# Patient Record
Sex: MALE | Race: WHITE | NOT HISPANIC OR LATINO | Employment: OTHER | URBAN - METROPOLITAN AREA
[De-identification: names, ages, dates, MRNs, and addresses within clinical notes are randomized per-mention and may not be internally consistent; named-entity substitution may affect disease eponyms.]

---

## 2017-07-24 ENCOUNTER — HOSPITAL ENCOUNTER (OUTPATIENT)
Facility: CLINIC | Age: 62
Setting detail: OBSERVATION
Discharge: HOME | End: 2017-07-25
Attending: EMERGENCY MEDICINE | Admitting: INTERNAL MEDICINE
Payer: COMMERCIAL

## 2017-07-24 ENCOUNTER — APPOINTMENT (OUTPATIENT)
Dept: GENERAL RADIOLOGY | Facility: CLINIC | Age: 62
End: 2017-07-24
Attending: EMERGENCY MEDICINE
Payer: COMMERCIAL

## 2017-07-24 DIAGNOSIS — R07.9 CHEST PAIN: ICD-10-CM

## 2017-07-24 DIAGNOSIS — I10 BENIGN ESSENTIAL HYPERTENSION: Primary | ICD-10-CM

## 2017-07-24 LAB
ALBUMIN SERPL-MCNC: 3.6 G/DL (ref 3.4–5)
ALP SERPL-CCNC: 83 U/L (ref 40–150)
ALT SERPL W P-5'-P-CCNC: 31 U/L (ref 0–70)
ANION GAP SERPL CALCULATED.3IONS-SCNC: 8 MMOL/L (ref 3–14)
AST SERPL W P-5'-P-CCNC: 19 U/L (ref 0–45)
BILIRUB SERPL-MCNC: 0.5 MG/DL (ref 0.2–1.3)
BUN SERPL-MCNC: 7 MG/DL (ref 7–30)
CALCIUM SERPL-MCNC: 8.4 MG/DL (ref 8.5–10.1)
CHLORIDE SERPL-SCNC: 106 MMOL/L (ref 94–109)
CO2 SERPL-SCNC: 26 MMOL/L (ref 20–32)
CREAT SERPL-MCNC: 0.72 MG/DL (ref 0.66–1.25)
GFR SERPL CREATININE-BSD FRML MDRD: ABNORMAL ML/MIN/1.7M2
GLUCOSE SERPL-MCNC: 86 MG/DL (ref 70–99)
HBA1C MFR BLD: 5.4 % (ref 4.3–6)
INTERPRETATION ECG - MUSE: NORMAL
POTASSIUM SERPL-SCNC: 4.8 MMOL/L (ref 3.4–5.3)
PROT SERPL-MCNC: 6.8 G/DL (ref 6.8–8.8)
SODIUM SERPL-SCNC: 140 MMOL/L (ref 133–144)
TROPONIN I SERPL-MCNC: NORMAL UG/L (ref 0–0.04)

## 2017-07-24 PROCEDURE — 83036 HEMOGLOBIN GLYCOSYLATED A1C: CPT | Performed by: PHYSICIAN ASSISTANT

## 2017-07-24 PROCEDURE — 84484 ASSAY OF TROPONIN QUANT: CPT | Mod: 91 | Performed by: PHYSICIAN ASSISTANT

## 2017-07-24 PROCEDURE — 25000132 ZZH RX MED GY IP 250 OP 250 PS 637: Performed by: PHYSICIAN ASSISTANT

## 2017-07-24 PROCEDURE — 71020 XR CHEST 2 VW: CPT

## 2017-07-24 PROCEDURE — 99285 EMERGENCY DEPT VISIT HI MDM: CPT | Mod: 25

## 2017-07-24 PROCEDURE — 25000132 ZZH RX MED GY IP 250 OP 250 PS 637: Performed by: EMERGENCY MEDICINE

## 2017-07-24 PROCEDURE — 99220 ZZC INITIAL OBSERVATION CARE,LEVL III: CPT | Performed by: INTERNAL MEDICINE

## 2017-07-24 PROCEDURE — 93005 ELECTROCARDIOGRAM TRACING: CPT

## 2017-07-24 PROCEDURE — 84484 ASSAY OF TROPONIN QUANT: CPT | Mod: 91 | Performed by: EMERGENCY MEDICINE

## 2017-07-24 PROCEDURE — G0378 HOSPITAL OBSERVATION PER HR: HCPCS

## 2017-07-24 PROCEDURE — 36415 COLL VENOUS BLD VENIPUNCTURE: CPT | Performed by: PHYSICIAN ASSISTANT

## 2017-07-24 PROCEDURE — 80053 COMPREHEN METABOLIC PANEL: CPT | Performed by: EMERGENCY MEDICINE

## 2017-07-24 RX ORDER — IPRATROPIUM BROMIDE AND ALBUTEROL SULFATE 2.5; .5 MG/3ML; MG/3ML
3 SOLUTION RESPIRATORY (INHALATION) EVERY 4 HOURS PRN
Status: DISCONTINUED | OUTPATIENT
Start: 2017-07-24 | End: 2017-07-25 | Stop reason: HOSPADM

## 2017-07-24 RX ORDER — ASPIRIN 81 MG/1
324 TABLET, CHEWABLE ORAL ONCE
Status: COMPLETED | OUTPATIENT
Start: 2017-07-24 | End: 2017-07-24

## 2017-07-24 RX ORDER — ALBUTEROL SULFATE 0.83 MG/ML
2.5 SOLUTION RESPIRATORY (INHALATION)
Status: DISCONTINUED | OUTPATIENT
Start: 2017-07-24 | End: 2017-07-25 | Stop reason: HOSPADM

## 2017-07-24 RX ORDER — ACETAMINOPHEN 650 MG/1
650 SUPPOSITORY RECTAL EVERY 4 HOURS PRN
Status: DISCONTINUED | OUTPATIENT
Start: 2017-07-24 | End: 2017-07-25 | Stop reason: HOSPADM

## 2017-07-24 RX ORDER — ALUMINA, MAGNESIA, AND SIMETHICONE 2400; 2400; 240 MG/30ML; MG/30ML; MG/30ML
15-30 SUSPENSION ORAL EVERY 4 HOURS PRN
Status: DISCONTINUED | OUTPATIENT
Start: 2017-07-24 | End: 2017-07-25 | Stop reason: HOSPADM

## 2017-07-24 RX ORDER — NITROGLYCERIN 0.4 MG/1
0.4 TABLET SUBLINGUAL EVERY 5 MIN PRN
Status: DISCONTINUED | OUTPATIENT
Start: 2017-07-24 | End: 2017-07-25 | Stop reason: HOSPADM

## 2017-07-24 RX ORDER — NALOXONE HYDROCHLORIDE 0.4 MG/ML
.1-.4 INJECTION, SOLUTION INTRAMUSCULAR; INTRAVENOUS; SUBCUTANEOUS
Status: DISCONTINUED | OUTPATIENT
Start: 2017-07-24 | End: 2017-07-25 | Stop reason: HOSPADM

## 2017-07-24 RX ORDER — LISINOPRIL 10 MG/1
10 TABLET ORAL DAILY
Status: DISCONTINUED | OUTPATIENT
Start: 2017-07-24 | End: 2017-07-25 | Stop reason: HOSPADM

## 2017-07-24 RX ORDER — ASPIRIN 81 MG/1
162 TABLET, CHEWABLE ORAL ONCE
Status: DISCONTINUED | OUTPATIENT
Start: 2017-07-24 | End: 2017-07-24

## 2017-07-24 RX ORDER — HYDRALAZINE HYDROCHLORIDE 20 MG/ML
10 INJECTION INTRAMUSCULAR; INTRAVENOUS EVERY 4 HOURS PRN
Status: DISCONTINUED | OUTPATIENT
Start: 2017-07-24 | End: 2017-07-25 | Stop reason: HOSPADM

## 2017-07-24 RX ORDER — ASPIRIN 81 MG/1
81 TABLET ORAL DAILY
Status: DISCONTINUED | OUTPATIENT
Start: 2017-07-25 | End: 2017-07-25 | Stop reason: HOSPADM

## 2017-07-24 RX ORDER — SIMVASTATIN 40 MG
40 TABLET ORAL AT BEDTIME
Status: DISCONTINUED | OUTPATIENT
Start: 2017-07-24 | End: 2017-07-25 | Stop reason: HOSPADM

## 2017-07-24 RX ORDER — LIDOCAINE 40 MG/G
CREAM TOPICAL
Status: DISCONTINUED | OUTPATIENT
Start: 2017-07-24 | End: 2017-07-25 | Stop reason: HOSPADM

## 2017-07-24 RX ORDER — ACETAMINOPHEN 325 MG/1
650 TABLET ORAL EVERY 4 HOURS PRN
Status: DISCONTINUED | OUTPATIENT
Start: 2017-07-24 | End: 2017-07-25 | Stop reason: HOSPADM

## 2017-07-24 RX ORDER — GUAIFENESIN 600 MG/1
600 TABLET, EXTENDED RELEASE ORAL 2 TIMES DAILY
Status: DISCONTINUED | OUTPATIENT
Start: 2017-07-24 | End: 2017-07-25 | Stop reason: HOSPADM

## 2017-07-24 RX ADMIN — FLUTICASONE FUROATE AND VILANTEROL TRIFENATATE 1 PUFF: 100; 25 POWDER RESPIRATORY (INHALATION) at 14:15

## 2017-07-24 RX ADMIN — LISINOPRIL 10 MG: 10 TABLET ORAL at 13:41

## 2017-07-24 RX ADMIN — ASPIRIN 81 MG 324 MG: 81 TABLET ORAL at 09:34

## 2017-07-24 RX ADMIN — GUAIFENESIN 600 MG: 600 TABLET, EXTENDED RELEASE ORAL at 21:06

## 2017-07-24 RX ADMIN — SIMVASTATIN 40 MG: 40 TABLET, FILM COATED ORAL at 21:06

## 2017-07-24 ASSESSMENT — ENCOUNTER SYMPTOMS
HEADACHES: 1
ARTHRALGIAS: 1

## 2017-07-24 NOTE — ED NOTES
Bed: ED22  Expected date:   Expected time:   Means of arrival:   Comments:  North -  chest pain eta 0815

## 2017-07-24 NOTE — H&P
[facility]    History and Physical  Hospitalist       Date of Admission:  7/24/2017  Date of Service (when I saw the patient): 07/24/17    Assessment & Plan   North Marshall is a 62 year old male with past medical history of COPD, hyperlipidemia, tobacco abuse, non-obstructive CAD who presents with chest pain.    Chest pain. Patient with two days of stabbing chest pain with exertion. Patient has significant risk factors of tobacco abuse (2-3 PPD) and hyperlipidemia with suspected undiagnosed hypertension. 12 lead EKG in ED shows no ST elevation. Initial troponin negative.  --  Admit to observation  --  Continue to trend troponins x2  --  Monitor on telemetry  --  NM lexiscan tomorrow AM    Hypertension. Patients blood pressure initially 180/82 on presentation. Subsequently improved to 153/79. Looking back at prior clinic visits, patient has had elevated blood pressure during those visits.  --  Start Lisinopril 10 mg daily    Hx Coronary Artery Disease: Patient has had a CT coronary angiogram that shows non-obstructive CAD. Continue daily ASA, simvastatin. Continue workup as noted above.    COPD: Patient does not take any inhalers at home but has frequent episodes of bronchitis.   --  Start Advair BID  --  PRN DuoNebs and albuterol    Hyperlipidemia: Continue prior to admission simvastatin.   --  Check fasting lipid panel tomorrow AM.    Tobacco Abuse: Patient smokes 2-3 packs per day. He declines counseling while hospitalized. I encouraged cessation and explained cardiac risks. Patient requests nicotine gum while hospitalized.    DVT Prophylaxis: Low Risk/Ambulatory with no VTE prophylaxis indicated  Code Status: Full Code    Disposition: Expected discharge in 1-2 days once cardiac workup complete.    Cheryl Lancaster PA-C    The patient was discussed with Dr. De Los Santos who agrees with the plan as outlined above.    Primary Care Physician   Dr Luis Alfredo Villalobos    Chief Complaint  "  Chest pain    History is obtained from the patient    History of Present Illness   North Marshall is a 62 year old male with past medical history significant for COPD, hyperlipidemia, tobacco abuse, peripheral vascular disease who presents with chest pain. Patient has had two days of chest pain provoked by activity. Pain initially awoke patient from sleep 7/22. Since that time, with small amounts of activity, patient has sharp stabbing chest pain on the left side of his chest. This will last for many minutes to an hour before residing with rest. His wife notes that she feels he has been breathing more heavily with these episodes, however the patient denies shortness of breath. Due to ongoing symptoms he presented to [facility] for further evaluation.     Patient has a recent CT coronary from 6/2016 that showed nonobstructive coronary artery disease with luminal irregularity in LAD and circumflex. NM stress test from 12/2013 shows no abnormalities.    I met with the patient in the ED. He denies any chest pain with rest. Prior to two days ago, he denies any chest pain with exertion. No chest pain with rest. He endorses smoking 2-3 packs of cigarettes daily \"for my whole life\". He irregularly follows with a primary care provider. He notes he has been coughing more frequently and feels as though he has chest congestion. Denies any diaphoresis, nausea, vomiting with the chest pain. He has had tingling in his left hand with the chest pain.    Past Medical History    I have reviewed this patient's medical history and updated it with pertinent information if needed.   COPD  HLP  WPW  Anxiety  Tobacco Abuse  Osteoarthritis  Peripheral Vascular Disease  CAD nonobstructive  GERD  Vitamin D deficiency    Past Surgical History   I have reviewed this patient's surgical history and updated it with pertinent information if needed.  Appendectomy  Ascending aortic aneurysm repair    Prior to Admission Medications "   Prior to Admission Medications   Prescriptions Last Dose Informant Patient Reported? Taking?   Ibuprofen (ADVIL PO) Past Month at prn Self Yes Yes   Sig: Take 200 mg by mouth every 8 hours as needed for moderate pain   SIMVASTATIN PO 7/23/2017 at Unknown time Self Yes Yes   Sig: Take 40 mg by mouth At Bedtime      Facility-Administered Medications: None     Allergies   No Known Allergies    Social History   I have reviewed this patient's social history and updated it with pertinent information if needed. [place] Marshall      Family History   I have reviewed this patient's family history and updated it with pertinent information if needed. Reviewed and noncontributory  Review of Systems   The 10 point Review of Systems is negative other than noted in the HPI    Physical Exam   Temp: 98.2  F (36.8  C) Temp src: Oral BP: 153/79   Heart Rate: 59 Resp: 12 SpO2: 97 % O2 Device: None (Room air)    Vital Signs with Ranges  Temp:  [98.2  F (36.8  C)] 98.2  F (36.8  C)  Heart Rate:  [59] 59  Resp:  [12] 12  BP: (153-180)/(79-82) 153/79  SpO2:  [97 %] 97 %  220 lbs 0 oz    Constitutional: Well appearing male  HEENT: Equal and reactive to light. Mucous membranes moist  Respiratory: CTAB  Cardiovascular: RRR, no murmur  GI: Obese, soft non tender, non distended, normoactive bowel sounds  Skin: warm, dry  Musculoskeletal: no gross deformities  Neurologic: oriented x3  Psychiatric: calm, cooperative    Data   Data reviewed today:  I personally reviewed the EKG tracing showing normal sinus rhythm.    Recent Labs  Lab 07/24/17  0915      POTASSIUM 4.8   CHLORIDE 106   CO2 26   BUN 7   CR 0.72   ANIONGAP 8   LIZBETH 8.4*   GLC 86   ALBUMIN 3.6   PROTTOTAL 6.8   BILITOTAL 0.5   ALKPHOS 83   ALT 31   AST 19   TROPI <0.015The 99th percentile for upper reference range is 0.045 ug/L.  Troponin values in the range of 0.045 - 0.120 ug/L may be associated with risks of adverse clinical events.       No results found for this or any  previous visit (from the past 24 hour(s)).

## 2017-07-24 NOTE — ED PROVIDER NOTES
History     Chief Complaint:  Chest Pain      The history is provided by the patient.      North Marshall is a 62 year old male with a history of COPD, HLD, and WPW who presents with chest pain. The patient began having chest pain 7/22 with associated left hand tingling. He was telling his symptoms to a nurse who told him to go to the ED via EMS. His BP was 196/100. Upon arrival he reports pain in his chest that is aggravated with activity, but at rest it is okay. He has never experienced this type of pain before. He currently rates his pain 3/10 in severity. He also reports that the pain radiates to his left shoulder and jaw, and he has a headache. He has no other medical concerns.    Allergies:  Influenza vaccine    Medications:    Mycostatin  Zocor  Deltasone  Albuterol  Medrol  Simvastatin    Past Medical History:    Acute ill-defined cerebrovascular disease  Lung disease  PVD  Prostate disorder  Lumbar disc displacement without myelopathy  HLD  Osteoarthritis    Past Surgical History:    Appendectomy  Descending aortic aneurysm w/ stent    Family History:    Bipolar disorder  Prostate cancer    Social History:  Presents via EMS and with family   Tobacco use: 0.50 PPD for 40 years  Alcohol use: 16.8 oz per week  PCP: Luis Alfredo Villalobos    Marital Status:       Review of Systems   Cardiovascular: Positive for chest pain.   Musculoskeletal: Positive for arthralgias.   Neurological: Positive for headaches.   All other systems reviewed and are negative.    Physical Exam     Patient Vitals for the past 24 hrs:   BP Temp Temp src Heart Rate Resp SpO2 Height Weight   07/24/17 1030 153/79 - - - - - - -   07/24/17 0849 180/82 98.2  F (36.8  C) Oral 59 12 97 % 1.829 m (6') 99.8 kg (220 lb)       Physical Exam  Constitutional: The patient is oriented to person, place, and time.   HENT:   Head: Atraumatic  Right Ear: Normal  Left Ear: Normal  Nose: Nose normal.   Mouth/Throat: Oropharynx is clear and  moist. No erythema or exudate.   Eyes: Conjunctivae and EOM are normal. Pupils are equal, round, and reactive to light. No discharge  Neck: Normal range of motion. Neck supple.   Cardiovascular: Normal rate, regular rhythm, no murmur gallops or rubs. Intact distal pulses.    Pulmonary/Chest: CTA bilaterally. No wheezes rale or rhonchi.  Abdominal: Soft. Non tender.  No masses   Musculoskeletal: No edema. No bony deformity. Normal range of motion  Lymphadenopathy: The patient has no cervical adenopathy.   Neurological: The patient is alert and oriented to person, place, and time. The patient has normal strength and normal reflexes. No cranial nerve deficit. Coordination normal.  Skin: Skin is warm and dry. No rash noted. The patient is not diaphoretic.   Psychiatric: The patient has a normal mood and affect.     Emergency Department Course   ECG (8:46:46):  Rate 65 bpm. VA interval 164. QRS duration 80. QT/QTc 410/426. P-R-T axes 53 -4 38. Normal sinus rhythm. Normal ECG. Agree with computer interpretation. Interpreted at 0920 by Jm Elizabeth MD.     Imaging:  Radiographic findings were communicated with the patient who voiced understanding of the findings.  Chest XR, PA & LAT  IMPRESSION: The cardiac silhouette and pulmonary vasculature are  normal. No evidence of pneumothorax or pleural effusion. There are no  acute infiltrates.    SHARLENE PARRA MD    Imaging independently reviewed and agree with radiologist interpretation.    Laboratory:  BMP: Calcium 8.4 (L), o/w WNL (Creatinine 0.72)  1030: Troponin: <0.015    Interventions:  0934: Aspirin 324 mg PO    Emergency Department Course:  Past medical records, nursing notes, and vitals reviewed.  0919: I performed an exam of the patient and obtained history, as documented above.     Findings and plan explained to the Patient who consents to admission.     1055: Discussed the patient with Dr. De Los Santos, who will admit the patient to a observation bed for further  monitoring, evaluation, and treatment.     Impression & Plan    Medical Decision Making:  North Marshall is a 62 year old male who presents for evaluation of chest pain. Initial laboratory and imaging tests have come back normal. There is no clinical, laboratory, or radiographic evidence of pulmonary embolism, aortic dissection, pneumonia, pneumothorax or cardiac ischemia.  Other etiologies of chest pain considered in this patient included chest wall source, esophageal spasm or GI source, pleuritis, referred pain, etc.  My suspicion of unstable angina at this point is very low and given risk/benefit ratio would not start Lovenox or heparin. Given the nature and timing of the patient's symptoms, I will admit the patient  to observation status to the Observation Unit for further cardiac monitoring, follow up troponin markers, and stress echocardiography should the enzymes remain negative.  Dr. De Los Santos is accepting.    Diagnosis:    ICD-10-CM   1. Chest pain R07.9       Disposition:  Admitted to Dr. De Los Santos for further evaluation and treatment.      Duane Del Valle  7/24/2017    EMERGENCY DEPARTMENT  I, Duane Del Valle, am serving as a scribe at 9:19 AM on 7/24/2017 to document services personally performed by Jm Elizabeth MD based on my observations and the provider's statements to me.       Jm Elizabeth MD  07/24/17 7183

## 2017-07-24 NOTE — IP AVS SNAPSHOT
[facility]    [address] [place] [address]., [address]    [place] [address]    Phone:  [phone]                                       After Visit Summary   7/24/2017    North Marshall    MRN: [id number]           After Visit Summary Signature Page     I have received my discharge instructions, and my questions have been answered. I have discussed any challenges I see with this plan with the nurse or doctor.    ..........................................................................................................................................  Patient/Patient Representative Signature      ..........................................................................................................................................  Patient Representative Print Name and Relationship to Patient    ..................................................               ................................................  Date                                            Time    ..........................................................................................................................................  Reviewed by Signature/Title    ...................................................              ..............................................  Date                                                            Time

## 2017-07-24 NOTE — IP AVS SNAPSHOT
MRN:[id number]                      After Visit Summary   7/24/2017    North Marshall    MRN: [id number]           Thank you!     Thank you for choosing [facility] for your care. Our goal is always to provide you with excellent care. Hearing back from our patients is one way we can continue to improve our services. Please take a few minutes to complete the written survey that you may receive in the mail after you visit with us. Thank you!        Patient Information     Date Of Birth          1955        Designated Caregiver       Most Recent Value    Caregiver    Will someone help with your care after discharge? no      About your hospital stay     You were admitted on:  July 24, 2017 You last received care in the:  [facility]    You were discharged on:  July 25, 2017        Reason for your hospital stay       You have been hospitalied for chest pain and uncontrolled hypertension.                  Who to Call     For medical emergencies, please call 911.  For non-urgent questions about your medical care, please call your primary care provider or clinic, [phone]          Attending Provider     Provider Specialty    Jm Elizabeth MD Emergency Medicine    [facility], Parker FRANCIS MD Internal Medicine       Primary Care Provider Office Phone # Fax #    Luis Alfredo Villalobos [phone]      After Care Instructions     Activity       Your activity upon discharge: activity as tolerated      No tobacco use            Diet       Follow this diet upon discharge: Orders Placed This Encounter      Combination Diet Low Saturated Fat Na <2400mg Diet                  Follow-up Appointments     Follow-up and recommended labs and tests        Follow up with primary care provider, Luis Alfredo Villalobos, within 14 days for hospital follow- up.  The following labs/tests are recommended: Basic metabolic panel.                  Pending Results     No orders  "found for last 3 day(s).            Statement of Approval     Ordered          17 1515  I have reviewed and agree with all the recommendations and orders detailed in this document.  EFFECTIVE NOW     Approved and electronically signed by:  Parker De Los Santos MD             Admission Information     Date & Time Provider Department Dept. Phone    2017 Parker De Los Santos MD [facility] [phone]      Your Vitals Were     Blood Pressure Temperature Respirations Height Weight Pulse Oximetry    132/68 (BP Location: Left arm) 97.7  F (36.5  C) (Oral) 16 1.829 m (6') 96 kg (211 lb 10.3 oz) 97%    BMI (Body Mass Index)                   28.7 kg/m2           [facility] Information     [facility] lets you send messages to your doctor, view your test results, renew your prescriptions, schedule appointments and more. To sign up, go to [email or web address] . Click on \"Log in\" on the left side of the screen, which will take you to the Welcome page. Then click on \"Sign up Now\" on the right side of the page.     You will be asked to enter the access code listed below, as well as some personal information. Please follow the directions to create your username and password.     Your access code is: [email or web address]  Expires: 10/22/2017 10:37 AM     Your access code will  in 90 days. If you need help or a new code, please call your [place] [facility] clinic or [phone].        Care EveryWhere ID     This is your Care EveryWhere ID. This could be used by other organizations to access your [facility] medical records  [id number]        Equal Access to Services     [facility]: Hadii sherman Guzman, wagiancarlo mehta, qaholly cm. So [facility] [phone].    ATENCIÓN: Si habla español, tiene a enrique disposición servicios gratuitos de asistencia lingüística. Llame al [phone].    We comply with applicable federal civil rights laws and [place] " laws. We do not discriminate on the basis of race, color, national origin, age, disability sex, sexual orientation or gender identity.               Review of your medicines      START taking        Dose / Directions    aspirin 81 MG EC tablet   Used for:  Benign essential hypertension        Dose:  81 mg   Take 1 tablet (81 mg) by mouth daily   Quantity:  30 tablet   Refills:  0       lisinopril 10 MG tablet   Commonly known as:  PRINIVIL/ZESTRIL   Used for:  Benign essential hypertension        Dose:  10 mg   Take 1 tablet (10 mg) by mouth daily   Quantity:  30 tablet   Refills:  0         CONTINUE these medicines which have NOT CHANGED        Dose / Directions    SIMVASTATIN PO        Dose:  40 mg   Take 40 mg by mouth At Bedtime   Refills:  0         STOP taking     ADVIL PO                Where to get your medicines      These medications were sent to [facility]/pharmacy #[id number] - [place], [place] - [address]  [address] [address]     Phone:  [phone]     aspirin 81 MG EC tablet    lisinopril 10 MG tablet                Protect others around you: Learn how to safely use, store and throw away your medicines at www.disposemymeds.org.             Medication List: This is a list of all your medications and when to take them. Check marks below indicate your daily home schedule. Keep this list as a reference.      Medications           Morning Afternoon Evening Bedtime As Needed    aspirin 81 MG EC tablet   Take 1 tablet (81 mg) by mouth daily   Last time this was given:  81 mg on 7/25/2017  7:48 AM                                   lisinopril 10 MG tablet   Commonly known as:  PRINIVIL/ZESTRIL   Take 1 tablet (10 mg) by mouth daily   Last time this was given:  10 mg on 7/25/2017 10:49 AM                                   SIMVASTATIN PO   Take 40 mg by mouth At Bedtime   Last time this was given:  40 mg on 7/24/2017  9:06 PM

## 2017-07-24 NOTE — ED NOTES
[facility]  ED Nurse Handoff Report    ED Chief complaint: Chest Pain (Pain started saturday night at the cabin. Left hand tingling. High blood pressure 186/100)      ED Diagnosis:   Final diagnoses:   Chest pain       Code Status: Full Code    Allergies: No Known Allergies    Activity level - Baseline/Home:  Independent       Activity Level - Current:   Independent     Needed?: No    Isolation: Yes  Infection: Not Applicable    Bariatric?: No    Vital Signs:   Vitals:    07/24/17 0849 07/24/17 1030   BP: 180/82 153/79   Resp: 12    Temp: 98.2  F (36.8  C)    TempSrc: Oral    SpO2: 97%    Weight: 99.8 kg (220 lb)    Height: 1.829 m (6')        Cardiac Rhythm: ,     NSR  Pain level: 0-10 Pain Scale: 3    Is this patient confused?: No    Patient Report: Initial Complaint:  Chest pain started on Sunday night while he was on his cabin  Focused Assessment: Admitted with chest pain, elevated blood pressures 180/82, smoker.  This morning he was at work and the pain started again.  In ED initial blood pressure 180/82 , last /73.  Mid sternal pain, numbness on left hand.  Tests Performed:  EKG  Is NSR.  Labs done.  Abnormal Results:   Treatments provided: Aspirin 324 mg    Family Comments: Present in ER.  Supportive.    OBS brochure/video discussed/provided to patient: yes      ED Medications:   Medications   aspirin chewable tablet 324 mg (324 mg Oral Given 7/24/17 0934)       Drips infusing?:  No      ED NURSE PHONE NUMBER: [phone]

## 2017-07-24 NOTE — PLAN OF CARE
Problem: Goal Outcome Summary  Goal: Goal Outcome Summary  Outcome: No Change  A/O, VSS ex BP 140s-150s/60s-70s, O2sats 97% on RA. Tele NSR, 50s-60s, pt states HR can go into 40s when sleeping. Denies pain, CP, or SOB; mild GRISSOM. Independent, steady. Given education on lisinopril and breo ellipta inhaler. Plan for NM stress test tomorrow AM.

## 2017-07-25 ENCOUNTER — APPOINTMENT (OUTPATIENT)
Dept: NUCLEAR MEDICINE | Facility: CLINIC | Age: 62
End: 2017-07-25
Attending: PHYSICIAN ASSISTANT
Payer: COMMERCIAL

## 2017-07-25 ENCOUNTER — APPOINTMENT (OUTPATIENT)
Dept: CARDIOLOGY | Facility: CLINIC | Age: 62
End: 2017-07-25
Attending: PHYSICIAN ASSISTANT
Payer: COMMERCIAL

## 2017-07-25 VITALS
RESPIRATION RATE: 16 BRPM | TEMPERATURE: 97.7 F | WEIGHT: 211.64 LBS | DIASTOLIC BLOOD PRESSURE: 68 MMHG | OXYGEN SATURATION: 97 % | BODY MASS INDEX: 28.67 KG/M2 | HEIGHT: 72 IN | SYSTOLIC BLOOD PRESSURE: 132 MMHG

## 2017-07-25 LAB
CHOLEST SERPL-MCNC: 154 MG/DL
HDLC SERPL-MCNC: 33 MG/DL
LDLC SERPL CALC-MCNC: 84 MG/DL
NONHDLC SERPL-MCNC: 121 MG/DL
TRIGL SERPL-MCNC: 185 MG/DL

## 2017-07-25 PROCEDURE — 93018 CV STRESS TEST I&R ONLY: CPT | Performed by: INTERNAL MEDICINE

## 2017-07-25 PROCEDURE — 93016 CV STRESS TEST SUPVJ ONLY: CPT | Performed by: INTERNAL MEDICINE

## 2017-07-25 PROCEDURE — 25000132 ZZH RX MED GY IP 250 OP 250 PS 637: Performed by: PHYSICIAN ASSISTANT

## 2017-07-25 PROCEDURE — 78452 HT MUSCLE IMAGE SPECT MULT: CPT | Mod: 26 | Performed by: INTERNAL MEDICINE

## 2017-07-25 PROCEDURE — 80061 LIPID PANEL: CPT | Performed by: PHYSICIAN ASSISTANT

## 2017-07-25 PROCEDURE — 40000855 ZZH STATISTIC ECHO STRESS OR NM NPI

## 2017-07-25 PROCEDURE — A9502 TC99M TETROFOSMIN: HCPCS | Performed by: INTERNAL MEDICINE

## 2017-07-25 PROCEDURE — 78452 HT MUSCLE IMAGE SPECT MULT: CPT

## 2017-07-25 PROCEDURE — G0378 HOSPITAL OBSERVATION PER HR: HCPCS

## 2017-07-25 PROCEDURE — 25000128 H RX IP 250 OP 636: Performed by: INTERNAL MEDICINE

## 2017-07-25 PROCEDURE — 34300033 ZZH RX 343: Performed by: INTERNAL MEDICINE

## 2017-07-25 PROCEDURE — 93017 CV STRESS TEST TRACING ONLY: CPT

## 2017-07-25 PROCEDURE — 99217 ZZC OBSERVATION CARE DISCHARGE: CPT | Performed by: INTERNAL MEDICINE

## 2017-07-25 PROCEDURE — 36415 COLL VENOUS BLD VENIPUNCTURE: CPT | Performed by: PHYSICIAN ASSISTANT

## 2017-07-25 RX ORDER — ACYCLOVIR 200 MG/1
0-1 CAPSULE ORAL
Status: DISCONTINUED | OUTPATIENT
Start: 2017-07-25 | End: 2017-07-25

## 2017-07-25 RX ORDER — ALBUTEROL SULFATE 90 UG/1
2 AEROSOL, METERED RESPIRATORY (INHALATION) EVERY 5 MIN PRN
Status: DISCONTINUED | OUTPATIENT
Start: 2017-07-25 | End: 2017-07-25

## 2017-07-25 RX ORDER — REGADENOSON 0.08 MG/ML
0.4 INJECTION, SOLUTION INTRAVENOUS ONCE
Status: DISCONTINUED | OUTPATIENT
Start: 2017-07-25 | End: 2017-07-25

## 2017-07-25 RX ORDER — LISINOPRIL 10 MG/1
10 TABLET ORAL DAILY
Qty: 30 TABLET | Refills: 0 | Status: SHIPPED | OUTPATIENT
Start: 2017-07-25 | End: 2021-10-13

## 2017-07-25 RX ORDER — AMINOPHYLLINE 25 MG/ML
50-100 INJECTION, SOLUTION INTRAVENOUS
Status: DISCONTINUED | OUTPATIENT
Start: 2017-07-25 | End: 2017-07-25

## 2017-07-25 RX ADMIN — LISINOPRIL 10 MG: 10 TABLET ORAL at 10:49

## 2017-07-25 RX ADMIN — TETROFOSMIN 34 MCI.: 1.38 INJECTION, POWDER, LYOPHILIZED, FOR SOLUTION INTRAVENOUS at 09:04

## 2017-07-25 RX ADMIN — FLUTICASONE FUROATE AND VILANTEROL TRIFENATATE 1 PUFF: 100; 25 POWDER RESPIRATORY (INHALATION) at 07:47

## 2017-07-25 RX ADMIN — GUAIFENESIN 600 MG: 600 TABLET, EXTENDED RELEASE ORAL at 07:47

## 2017-07-25 RX ADMIN — REGADENOSON 0.4 MG: 0.08 INJECTION, SOLUTION INTRAVENOUS at 09:01

## 2017-07-25 RX ADMIN — ASPIRIN 81 MG: 81 TABLET, COATED ORAL at 07:48

## 2017-07-25 RX ADMIN — TETROFOSMIN 11.1 MCI.: 1.38 INJECTION, POWDER, LYOPHILIZED, FOR SOLUTION INTRAVENOUS at 07:05

## 2017-07-25 ASSESSMENT — ACTIVITIES OF DAILY LIVING (ADL)
BATHING: 0-->INDEPENDENT
SWALLOWING: 0-->SWALLOWS FOODS/LIQUIDS WITHOUT DIFFICULTY
AMBULATION: 0-->INDEPENDENT
TOILETING: 0-->INDEPENDENT
RETIRED_EATING: 0-->INDEPENDENT
TRANSFERRING: 0-->INDEPENDENT
FALL_HISTORY_WITHIN_LAST_SIX_MONTHS: NO
RETIRED_COMMUNICATION: 0-->UNDERSTANDS/COMMUNICATES WITHOUT DIFFICULTY
DRESS: 0-->INDEPENDENT
COGNITION: 0 - NO COGNITION ISSUES REPORTED

## 2017-07-25 NOTE — DISCHARGE SUMMARY
[facility]    Discharge Summary  Hospitalist    Date of Admission:  7/24/2017  Date of Discharge:  7/25/2017  4:06 PM  Discharging Provider: Parker De Los Santos MD  Date of Service (when I saw the patient): 07/25/17    Discharge Diagnoses   Chest pain, noncardiac.   Hypertensive urgency  Tobacco abuse    History of Present Illness   [place] is an 62 year old male who presented with chest pain.     Hospital Course   [place] Marshall was admitted on 7/24/2017.  The following problems were addressed during his hospitalization:    Hospital Problems:    The patient was admitted with exertional chest pain and was also noted to have hypertensive urgency. He was admitted as an observation patient, serial troponins were performed which remained within normal limits.    A lexiscan stress test was performed the results of which are as follows.     Impression  1.  Myocardial perfusion imaging using single isotope technique  demonstrated a small relatively fixed inferior/inferolateral defect  extending from the base towards the apex, rest greater than stress,  most likely consistent with diaphragm attenuation/bowel uptake  artifact. No significant area of ischemia or infarct noted..   2. Gated images demonstrated normal size left ventricle with vigorous  overall contractility and no significant regional wall motion  abnormality.  The left ventricular systolic function is hyperdynamic  with ejection fraction 80%.  3. Compared to the prior study from 1/16/2004 no significant change .    The patient did well overall. He was initiated on lisinopril and his blood pressures have subsequently improved. He should follow up with his primary MD regarding titration of the lisinopril.    I also counseled him at length on the need to quit smoking permanently.     Parker De Los Santos MD    Significant Results and Procedures     See above.     Pending Results      None    Code Status   Full Code       Primary Care  Physician   Luis Alfredo Villalobos    Physical Exam   Temp: 97.7  F (36.5  C) Temp src: Oral BP: 132/68   Heart Rate: 45 Resp: 16 SpO2: 97 % O2 Device: None (Room air)    Vitals:    07/24/17 0849 07/24/17 1307 07/25/17 0500   Weight: 99.8 kg (220 lb) 97.3 kg (214 lb 8.1 oz) 96 kg (211 lb 10.3 oz)     Vital Signs with Ranges  Temp:  [97.6  F (36.4  C)-98  F (36.7  C)] 97.7  F (36.5  C)  Heart Rate:  [45-81] 45  Resp:  [16-20] 16  BP: (124-136)/(62-68) 132/68  SpO2:  [93 %-97 %] 97 %  I/O last 3 completed shifts:  In: 240 [P.O.:240]  Out: 325 [Urine:325]      Discharge Disposition   Discharged to home  Condition at discharge: Stable    Consultations This Hospital Stay   None    Time Spent on this Encounter   I, Parker De Los Santos MD, personally saw the patient today and spent greater than 30 minutes discharging this patient.    Discharge Orders     Follow-up and recommended labs and tests    Follow up with primary care provider, Luis Alfredo Villalobos, within 14 days for hospital follow- up.  The following labs/tests are recommended: Basic metabolic panel.     Reason for your hospital stay   You have been hospitalied for chest pain and uncontrolled hypertension.     Activity   Your activity upon discharge: activity as tolerated      No tobacco use     Follow-up and recommended labs and tests    Follow up hospital appointment made with Dr. Luis Alfredo Villalobos at [facility]   Phone: [phone]  Appointment : August 3rd, check in at l:15 for 1:30 appointment. Bring insurance information and ID. PLease bring this paper work with you to your clinic visit. They will be getting a BMP lab at this visit.     Diet   Follow this diet upon discharge: Orders Placed This Encounter     Combination Diet Low Saturated Fat Na <2400mg Diet       Discharge Medications   Discharge Medication List as of 7/25/2017  3:30 PM      START taking these medications    Details   aspirin EC 81 MG EC tablet Take 1 tablet (81 mg) by mouth daily,  Disp-30 tablet, R-0, E-Prescribe      lisinopril (PRINIVIL/ZESTRIL) 10 MG tablet Take 1 tablet (10 mg) by mouth daily, Disp-30 tablet, R-0, E-Prescribe         CONTINUE these medications which have NOT CHANGED    Details   SIMVASTATIN PO Take 40 mg by mouth At Bedtime, Historical         STOP taking these medications       Ibuprofen (ADVIL PO) Comments:   Reason for Stopping:             Allergies   No Known Allergies  Data   Most Recent 3 CBC's:No lab results found.   Most Recent 3 BMP's:  Recent Labs   Lab Test  07/24/17   0915   NA  140   POTASSIUM  4.8   CHLORIDE  106   CO2  26   BUN  7   CR  0.72   ANIONGAP  8   LIZBETH  8.4*   GLC  86     Most Recent 2 LFT's:  Recent Labs   Lab Test  07/24/17   0915   AST  19   ALT  31   ALKPHOS  83   BILITOTAL  0.5     Most Recent INR's and Anticoagulation Dosing History:  Anticoagulation Dose History     There is no flowsheet data to display.        Most Recent 3 Troponin's:  Recent Labs   Lab Test  07/24/17 2005 07/24/17   1322  07/24/17   0915   TROPI  <0.015  The 99th percentile for upper reference range is 0.045 ug/L.  Troponin values in   the range of 0.045 - 0.120 ug/L may be associated with risks of adverse   clinical events.    <0.015  The 99th percentile for upper reference range is 0.045 ug/L.  Troponin values in   the range of 0.045 - 0.120 ug/L may be associated with risks of adverse   clinical events.    <0.015  The 99th percentile for upper reference range is 0.045 ug/L.  Troponin values in   the range of 0.045 - 0.120 ug/L may be associated with risks of adverse   clinical events.       Most Recent Cholesterol Panel:  Recent Labs   Lab Test  07/25/17   0615   CHOL  154   LDL  84   HDL  33*   TRIG  185*     Most Recent 6 Bacteria Isolates From Any Culture (See EPIC Reports for Culture Details):No lab results found.  Most Recent TSH, T4 and A1c Labs:  Recent Labs   Lab Test  07/24/17   1322   A1C  5.4     Results for orders placed or performed during the hospital  encounter of 07/24/17   Chest XR,  PA & LAT    Narrative    XR CHEST 2 VW 7/24/2017 11:51 AM     HISTORY: chest pain    COMPARISON: 11/3/2008      Impression    IMPRESSION: The cardiac silhouette and pulmonary vasculature are  normal. No evidence of pneumothorax or pleural effusion. There are no  acute infiltrates.    SHARLENE PARRA MD   NM Lexiscan stress test (nuc card)    Narrative    GATED MYOCARDIAL PERFUSION SCINTIGRAPHY WITH INTRAVENOUS PHARMACOLOGIC  VASODILATATION LEXISCAN -ONE DAY STUDY     7/25/2017 10:37 AM  GRACE BELLA  62 years  Male  1955.    Indication/Clinical History: S pain    Impression  1.  Myocardial perfusion imaging using single isotope technique  demonstrated a small relatively fixed inferior/inferolateral defect  extending from the base towards the apex, rest greater than stress,  most likely consistent with diaphragm attenuation/bowel uptake  artifact. No significant area of ischemia or infarct noted..   2. Gated images demonstrated normal size left ventricle with vigorous  overall contractility and no significant regional wall motion  abnormality.  The left ventricular systolic function is hyperdynamic  with ejection fraction 80%.  3. Compared to the prior study from 1/16/2004 no significant change .    Procedure  Pharmacologic stress testing was performed with Lexiscan at a rate of  0.08 mg/ml rapid bolus injection, for 15 seconds, 0.4 mg/5ml  intravenously. Low-level exercise was performed along with the  vasodilator infusion.  The heart rate was 47 at baseline and earl to  74 beats per minute during the Lexiscan infusion. The rest blood  pressure was 139/73 mmHg and was 136/62 mm Hg during Lexiscan  infusion. The patient experienced shortness of breath  during the  test.    Myocardial perfusion imaging was performed at rest, approximately 45  minutes after the injection intravenously of 11.1 mCi of Tc-99m  Myoview. At peak pharmacologic effect, 10-20 seconds after  Lexiscan,   the patient was injected intravenously with 34.0 mCi of  Tc-99m  Myoview. The post-stress tomographic imaging was performed  approximately 60 minutes after stress.    EKG Findings  The resting EKG demonstrated sinus bradycardia with no significant ST  abnormality. The stress EKG demonstrated normal sinus rhythm with no  significant ST depression.    Tomographic Findings  Overall, the study quality is adequate . On the stress images, there  is a very small mild inferior/inferolateral defect extending from the  base the mid ventricle. On the rest images, there is a small mild  inferior/inferolateral defect extending from the base towards the apex  . Gated images demonstrated normal size left ventricle with vigorous  overall contractility and no significant regional wall motion  abnormality. The left ventricular ejection fraction was calculated to  be 80% with stress 77% rest. TID was minimal most likely related IV  Lexiscan.    LIANA SANDOVAL MD

## 2017-07-25 NOTE — PLAN OF CARE
Problem: Goal Outcome Summary  Goal: Goal Outcome Summary  Pt d/c'd home with all belongings. All medications, d/c instructions, and follow up instructions/recommendations reviewed with pt and pt's wife. Pt verbalized understanding. Will follow up with PCP at [facility] within next 2 weeks.

## 2017-07-25 NOTE — PROGRESS NOTES
"Lexiscan: Has has Lexiscan previously and does not recall any problems with this. NKDA. Lungs very diminished, with minimal air movement. S1S2. Denies any pain. Pt walked on the treadmill during test. States he was feeling \"weird\", but denies SOB or CP. VSS. By the end of the exam, pt feeling OK, and walked back to room.  "

## 2017-07-25 NOTE — PLAN OF CARE
Problem: Goal Outcome Summary  Goal: Goal Outcome Summary  Outcome: No Change  Pt A&O. No chest pain, SOB or dizziness during the shift. SB-NSR on tele. VSS. Up independently.  NPO for lexiscan today. POC reviewed. Will continue to monitor pt.

## 2017-07-25 NOTE — PLAN OF CARE
Problem: Goal Outcome Summary  Goal: Goal Outcome Summary  Outcome: No Change  A&Ox4. Denies pain,no SOB. Vitals stable. TELE SB, HR in 40s-50s. Pt stated his HR goes up to 35 while sleeping. Asymptomatic. O2 sat maintained on 96-97% RA. NPO MN. Plan for nuclear stress tomorrow.

## 2019-09-03 ENCOUNTER — TRANSFERRED RECORDS (OUTPATIENT)
Dept: HEALTH INFORMATION MANAGEMENT | Facility: CLINIC | Age: 64
End: 2019-09-03
Payer: COMMERCIAL

## 2021-10-13 ENCOUNTER — HOSPITAL ENCOUNTER (OUTPATIENT)
Facility: CLINIC | Age: 66
Setting detail: OBSERVATION
Discharge: HOME | End: 2021-10-14
Attending: EMERGENCY MEDICINE | Admitting: INTERNAL MEDICINE
Payer: COMMERCIAL

## 2021-10-13 ENCOUNTER — APPOINTMENT (OUTPATIENT)
Dept: MRI IMAGING | Facility: CLINIC | Age: 66
End: 2021-10-13
Attending: EMERGENCY MEDICINE
Payer: COMMERCIAL

## 2021-10-13 ENCOUNTER — APPOINTMENT (OUTPATIENT)
Dept: CT IMAGING | Facility: CLINIC | Age: 66
End: 2021-10-13
Attending: EMERGENCY MEDICINE
Payer: COMMERCIAL

## 2021-10-13 DIAGNOSIS — S22.080A COMPRESSION FRACTURE OF T12 VERTEBRA, INITIAL ENCOUNTER (H): ICD-10-CM

## 2021-10-13 DIAGNOSIS — S06.4XAA EPIDURAL HEMATOMA (H): ICD-10-CM

## 2021-10-13 DIAGNOSIS — I10 BENIGN ESSENTIAL HYPERTENSION: ICD-10-CM

## 2021-10-13 DIAGNOSIS — S22.080A COMPRESSION FRACTURE OF T12 VERTEBRA, INITIAL ENCOUNTER (H): Primary | ICD-10-CM

## 2021-10-13 LAB
ANION GAP SERPL CALCULATED.3IONS-SCNC: 5 MMOL/L (ref 3–14)
BASOPHILS # BLD AUTO: 0 10E3/UL (ref 0–0.2)
BASOPHILS NFR BLD AUTO: 0 %
BUN SERPL-MCNC: 14 MG/DL (ref 7–30)
CALCIUM SERPL-MCNC: 8.8 MG/DL (ref 8.5–10.1)
CHLORIDE BLD-SCNC: 103 MMOL/L (ref 94–109)
CO2 SERPL-SCNC: 26 MMOL/L (ref 20–32)
CREAT SERPL-MCNC: 0.96 MG/DL (ref 0.66–1.25)
EOSINOPHIL # BLD AUTO: 0.1 10E3/UL (ref 0–0.7)
EOSINOPHIL NFR BLD AUTO: 1 %
ERYTHROCYTE [DISTWIDTH] IN BLOOD BY AUTOMATED COUNT: 12.5 % (ref 10–15)
GFR SERPL CREATININE-BSD FRML MDRD: 81 ML/MIN/1.73M2
GLUCOSE BLD-MCNC: 96 MG/DL (ref 70–99)
HCT VFR BLD AUTO: 42.3 % (ref 40–53)
HGB BLD-MCNC: 14.7 G/DL (ref 13.3–17.7)
IMM GRANULOCYTES # BLD: 0.1 10E3/UL
IMM GRANULOCYTES NFR BLD: 1 %
LYMPHOCYTES # BLD AUTO: 1.7 10E3/UL (ref 0.8–5.3)
LYMPHOCYTES NFR BLD AUTO: 17 %
MCH RBC QN AUTO: 32.4 PG (ref 26.5–33)
MCHC RBC AUTO-ENTMCNC: 34.8 G/DL (ref 31.5–36.5)
MCV RBC AUTO: 93 FL (ref 78–100)
MONOCYTES # BLD AUTO: 0.4 10E3/UL (ref 0–1.3)
MONOCYTES NFR BLD AUTO: 4 %
NEUTROPHILS # BLD AUTO: 7.7 10E3/UL (ref 1.6–8.3)
NEUTROPHILS NFR BLD AUTO: 77 %
NRBC # BLD AUTO: 0 10E3/UL
NRBC BLD AUTO-RTO: 0 /100
PLATELET # BLD AUTO: 191 10E3/UL (ref 150–450)
POTASSIUM BLD-SCNC: 3.9 MMOL/L (ref 3.4–5.3)
RBC # BLD AUTO: 4.54 10E6/UL (ref 4.4–5.9)
SODIUM SERPL-SCNC: 134 MMOL/L (ref 133–144)
WBC # BLD AUTO: 10 10E3/UL (ref 4–11)

## 2021-10-13 PROCEDURE — 80048 BASIC METABOLIC PNL TOTAL CA: CPT | Performed by: EMERGENCY MEDICINE

## 2021-10-13 PROCEDURE — 96375 TX/PRO/DX INJ NEW DRUG ADDON: CPT

## 2021-10-13 PROCEDURE — 72131 CT LUMBAR SPINE W/O DYE: CPT

## 2021-10-13 PROCEDURE — 96374 THER/PROPH/DIAG INJ IV PUSH: CPT

## 2021-10-13 PROCEDURE — 96372 THER/PROPH/DIAG INJ SC/IM: CPT | Performed by: EMERGENCY MEDICINE

## 2021-10-13 PROCEDURE — 99285 EMERGENCY DEPT VISIT HI MDM: CPT | Mod: 25

## 2021-10-13 PROCEDURE — 36415 COLL VENOUS BLD VENIPUNCTURE: CPT | Performed by: EMERGENCY MEDICINE

## 2021-10-13 PROCEDURE — 250N000011 HC RX IP 250 OP 636: Performed by: EMERGENCY MEDICINE

## 2021-10-13 PROCEDURE — 99205 OFFICE O/P NEW HI 60 MIN: CPT | Performed by: PHYSICIAN ASSISTANT

## 2021-10-13 PROCEDURE — C9803 HOPD COVID-19 SPEC COLLECT: HCPCS

## 2021-10-13 PROCEDURE — 72148 MRI LUMBAR SPINE W/O DYE: CPT

## 2021-10-13 PROCEDURE — 85004 AUTOMATED DIFF WBC COUNT: CPT | Performed by: EMERGENCY MEDICINE

## 2021-10-13 RX ORDER — VALSARTAN AND HYDROCHLOROTHIAZIDE 160; 12.5 MG/1; MG/1
1 TABLET, FILM COATED ORAL DAILY
COMMUNITY

## 2021-10-13 RX ORDER — ROSUVASTATIN CALCIUM 40 MG/1
40 TABLET, COATED ORAL DAILY
COMMUNITY

## 2021-10-13 RX ORDER — KETOROLAC TROMETHAMINE 15 MG/ML
15 INJECTION, SOLUTION INTRAMUSCULAR; INTRAVENOUS ONCE
Status: COMPLETED | OUTPATIENT
Start: 2021-10-13 | End: 2021-10-13

## 2021-10-13 RX ORDER — HYDROMORPHONE HYDROCHLORIDE 1 MG/ML
0.5 INJECTION, SOLUTION INTRAMUSCULAR; INTRAVENOUS; SUBCUTANEOUS
Status: COMPLETED | OUTPATIENT
Start: 2021-10-13 | End: 2021-10-13

## 2021-10-13 RX ORDER — DIAZEPAM 10 MG/2ML
5 INJECTION, SOLUTION INTRAMUSCULAR; INTRAVENOUS ONCE
Status: COMPLETED | OUTPATIENT
Start: 2021-10-13 | End: 2021-10-13

## 2021-10-13 RX ORDER — LORAZEPAM 2 MG/ML
1 INJECTION INTRAMUSCULAR ONCE
Status: COMPLETED | OUTPATIENT
Start: 2021-10-13 | End: 2021-10-13

## 2021-10-13 RX ADMIN — LORAZEPAM 1 MG: 2 INJECTION INTRAMUSCULAR; INTRAVENOUS at 21:44

## 2021-10-13 RX ADMIN — KETOROLAC TROMETHAMINE 15 MG: 15 INJECTION, SOLUTION INTRAMUSCULAR; INTRAVENOUS at 19:22

## 2021-10-13 RX ADMIN — HYDROMORPHONE HYDROCHLORIDE 0.5 MG: 1 INJECTION, SOLUTION INTRAMUSCULAR; INTRAVENOUS; SUBCUTANEOUS at 21:03

## 2021-10-13 RX ADMIN — DIAZEPAM 5 MG: 10 INJECTION, SOLUTION INTRAMUSCULAR; INTRAVENOUS at 19:21

## 2021-10-13 ASSESSMENT — MIFFLIN-ST. JEOR: SCORE: 1704.32

## 2021-10-13 NOTE — ED NOTES
Bed: ED19  Expected date:   Expected time:   Means of arrival:   Comments:  443  66 M back pain after fall  0202

## 2021-10-14 ENCOUNTER — APPOINTMENT (OUTPATIENT)
Dept: PHYSICAL THERAPY | Facility: CLINIC | Age: 66
End: 2021-10-14
Attending: INTERNAL MEDICINE
Payer: COMMERCIAL

## 2021-10-14 VITALS
WEIGHT: 200 LBS | TEMPERATURE: 98.7 F | HEIGHT: 71 IN | DIASTOLIC BLOOD PRESSURE: 56 MMHG | RESPIRATION RATE: 16 BRPM | SYSTOLIC BLOOD PRESSURE: 135 MMHG | OXYGEN SATURATION: 94 % | BODY MASS INDEX: 28 KG/M2 | HEART RATE: 57 BPM

## 2021-10-14 PROBLEM — E78.5 HYPERLIPIDEMIA: Status: ACTIVE | Noted: 2021-10-14

## 2021-10-14 PROBLEM — S22.080A COMPRESSION FRACTURE OF T12 VERTEBRA, INITIAL ENCOUNTER (H): Status: ACTIVE | Noted: 2021-10-14
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
## 2021-10-14 PROBLEM — I10 BENIGN ESSENTIAL HYPERTENSION: Status: ACTIVE | Noted: 2021-10-14

## 2021-10-14 PROBLEM — S06.4XAA EPIDURAL HEMATOMA (H): Status: ACTIVE | Noted: 2021-10-14

## 2021-10-14 LAB
APTT PPP: 33 SECONDS (ref 22–38)
INR PPP: 1.03 (ref 0.85–1.15)
SARS-COV-2 RNA RESP QL NAA+PROBE: NEGATIVE

## 2021-10-14 PROCEDURE — 85610 PROTHROMBIN TIME: CPT | Performed by: EMERGENCY MEDICINE

## 2021-10-14 PROCEDURE — 85730 THROMBOPLASTIN TIME PARTIAL: CPT | Performed by: EMERGENCY MEDICINE

## 2021-10-14 PROCEDURE — G0378 HOSPITAL OBSERVATION PER HR: HCPCS

## 2021-10-14 PROCEDURE — 250N000011 HC RX IP 250 OP 636: Performed by: INTERNAL MEDICINE

## 2021-10-14 PROCEDURE — 96376 TX/PRO/DX INJ SAME DRUG ADON: CPT

## 2021-10-14 PROCEDURE — 97161 PT EVAL LOW COMPLEX 20 MIN: CPT | Mod: GP

## 2021-10-14 PROCEDURE — 250N000013 HC RX MED GY IP 250 OP 250 PS 637: Performed by: INTERNAL MEDICINE

## 2021-10-14 PROCEDURE — 99219 PR INITIAL OBSERVATION CARE,LEVEL II: CPT | Performed by: INTERNAL MEDICINE

## 2021-10-14 PROCEDURE — 97530 THERAPEUTIC ACTIVITIES: CPT | Mod: GP

## 2021-10-14 PROCEDURE — 87635 SARS-COV-2 COVID-19 AMP PRB: CPT | Performed by: EMERGENCY MEDICINE

## 2021-10-14 PROCEDURE — 99207 PR NO CHARGE LOS: CPT | Performed by: INTERNAL MEDICINE

## 2021-10-14 PROCEDURE — 97116 GAIT TRAINING THERAPY: CPT | Mod: GP

## 2021-10-14 PROCEDURE — 36415 COLL VENOUS BLD VENIPUNCTURE: CPT | Performed by: EMERGENCY MEDICINE

## 2021-10-14 RX ORDER — NALOXONE HYDROCHLORIDE 0.4 MG/ML
0.4 INJECTION, SOLUTION INTRAMUSCULAR; INTRAVENOUS; SUBCUTANEOUS
Status: DISCONTINUED | OUTPATIENT
Start: 2021-10-14 | End: 2021-10-14 | Stop reason: HOSPADM

## 2021-10-14 RX ORDER — NALOXONE HYDROCHLORIDE 0.4 MG/ML
0.2 INJECTION, SOLUTION INTRAMUSCULAR; INTRAVENOUS; SUBCUTANEOUS
Status: DISCONTINUED | OUTPATIENT
Start: 2021-10-14 | End: 2021-10-14 | Stop reason: HOSPADM

## 2021-10-14 RX ORDER — NAPROXEN SODIUM 220 MG
220-440 TABLET ORAL 2 TIMES DAILY PRN
Refills: 0
Start: 2021-10-17

## 2021-10-14 RX ORDER — CYCLOBENZAPRINE HCL 10 MG
10 TABLET ORAL 3 TIMES DAILY PRN
Qty: 15 TABLET | Refills: 0 | Status: SHIPPED | OUTPATIENT
Start: 2021-10-14 | End: 2022-01-21

## 2021-10-14 RX ORDER — ACETAMINOPHEN 325 MG/1
975 TABLET ORAL EVERY 8 HOURS
Status: DISCONTINUED | OUTPATIENT
Start: 2021-10-14 | End: 2021-10-14 | Stop reason: HOSPADM

## 2021-10-14 RX ORDER — PROCHLORPERAZINE 25 MG
12.5 SUPPOSITORY, RECTAL RECTAL EVERY 12 HOURS PRN
Status: DISCONTINUED | OUTPATIENT
Start: 2021-10-14 | End: 2021-10-14 | Stop reason: HOSPADM

## 2021-10-14 RX ORDER — HYDROMORPHONE HYDROCHLORIDE 1 MG/ML
0.5 INJECTION, SOLUTION INTRAMUSCULAR; INTRAVENOUS; SUBCUTANEOUS
Status: DISCONTINUED | OUTPATIENT
Start: 2021-10-14 | End: 2021-10-14 | Stop reason: HOSPADM

## 2021-10-14 RX ORDER — HYDROMORPHONE HYDROCHLORIDE 2 MG/1
2 TABLET ORAL EVERY 4 HOURS PRN
Status: DISCONTINUED | OUTPATIENT
Start: 2021-10-14 | End: 2021-10-14 | Stop reason: HOSPADM

## 2021-10-14 RX ORDER — AMOXICILLIN 250 MG
2 CAPSULE ORAL 2 TIMES DAILY PRN
Status: DISCONTINUED | OUTPATIENT
Start: 2021-10-14 | End: 2021-10-14 | Stop reason: HOSPADM

## 2021-10-14 RX ORDER — HYDRALAZINE HYDROCHLORIDE 20 MG/ML
10 INJECTION INTRAMUSCULAR; INTRAVENOUS EVERY 4 HOURS PRN
Status: DISCONTINUED | OUTPATIENT
Start: 2021-10-14 | End: 2021-10-14 | Stop reason: HOSPADM

## 2021-10-14 RX ORDER — AMOXICILLIN 250 MG
1 CAPSULE ORAL 2 TIMES DAILY PRN
Refills: 0
Start: 2021-10-14

## 2021-10-14 RX ORDER — ALBUTEROL SULFATE 90 UG/1
1-2 AEROSOL, METERED RESPIRATORY (INHALATION) EVERY 6 HOURS PRN
COMMUNITY

## 2021-10-14 RX ORDER — KETOROLAC TROMETHAMINE 10 MG/1
10 TABLET, FILM COATED ORAL EVERY 4 HOURS PRN
Status: DISCONTINUED | OUTPATIENT
Start: 2021-10-14 | End: 2021-10-14 | Stop reason: HOSPADM

## 2021-10-14 RX ORDER — AMOXICILLIN 250 MG
1 CAPSULE ORAL 2 TIMES DAILY PRN
Status: DISCONTINUED | OUTPATIENT
Start: 2021-10-14 | End: 2021-10-14 | Stop reason: HOSPADM

## 2021-10-14 RX ORDER — ONDANSETRON 2 MG/ML
4 INJECTION INTRAMUSCULAR; INTRAVENOUS EVERY 6 HOURS PRN
Status: DISCONTINUED | OUTPATIENT
Start: 2021-10-14 | End: 2021-10-14 | Stop reason: HOSPADM

## 2021-10-14 RX ORDER — ACETAMINOPHEN 500 MG
500-1000 TABLET ORAL EVERY 6 HOURS PRN
COMMUNITY

## 2021-10-14 RX ORDER — MULTIVIT-MIN/IRON/FOLIC ACID/K 18-600-40
1 CAPSULE ORAL DAILY
COMMUNITY

## 2021-10-14 RX ORDER — POLYETHYLENE GLYCOL 3350 17 G/17G
17 POWDER, FOR SOLUTION ORAL DAILY PRN
Status: DISCONTINUED | OUTPATIENT
Start: 2021-10-14 | End: 2021-10-14 | Stop reason: HOSPADM

## 2021-10-14 RX ORDER — PROCHLORPERAZINE MALEATE 5 MG
5 TABLET ORAL EVERY 6 HOURS PRN
Status: DISCONTINUED | OUTPATIENT
Start: 2021-10-14 | End: 2021-10-14 | Stop reason: HOSPADM

## 2021-10-14 RX ORDER — OXYCODONE HYDROCHLORIDE 5 MG/1
5 TABLET ORAL EVERY 6 HOURS PRN
Qty: 15 TABLET | Refills: 0 | Status: SHIPPED | OUTPATIENT
Start: 2021-10-14 | End: 2021-10-17

## 2021-10-14 RX ORDER — CYCLOBENZAPRINE HCL 10 MG
10 TABLET ORAL EVERY 8 HOURS PRN
Status: DISCONTINUED | OUTPATIENT
Start: 2021-10-14 | End: 2021-10-14 | Stop reason: HOSPADM

## 2021-10-14 RX ORDER — MAGNESIUM OXIDE 400 MG/1
400 TABLET ORAL DAILY
COMMUNITY

## 2021-10-14 RX ORDER — ONDANSETRON 4 MG/1
4 TABLET, ORALLY DISINTEGRATING ORAL EVERY 6 HOURS PRN
Status: DISCONTINUED | OUTPATIENT
Start: 2021-10-14 | End: 2021-10-14 | Stop reason: HOSPADM

## 2021-10-14 RX ADMIN — ACETAMINOPHEN 975 MG: 325 TABLET, FILM COATED ORAL at 03:04

## 2021-10-14 RX ADMIN — ACETAMINOPHEN 975 MG: 325 TABLET, FILM COATED ORAL at 10:26

## 2021-10-14 RX ADMIN — HYDROMORPHONE HYDROCHLORIDE 0.5 MG: 1 INJECTION, SOLUTION INTRAMUSCULAR; INTRAVENOUS; SUBCUTANEOUS at 03:04

## 2021-10-14 NOTE — PLAN OF CARE
~Diagnostic tests and consults completed and resulted:   Yes MRI & CT revealing T12 compression fracture and epidural hematoma. Neuro surgery to see pt. This AM. PT consulted as well.   ~Vital signs normal or at patient baseline:   Yes  ~Adequate pain control on oral analgesics:   No, Pt. Concerned PO pain medications cost more money than IV pain medications/worried insurance will not cover. Social Work consult ordered. Explained to pt that we cannot send pt home with IV pain medication regimen and want to get an oral regimen in place; still concerned. Therefore; pt agreed to schedule tylenol overnight and PRN IV dilaudid for pain management and denies heat or ice.     Some pt. belongings sent to security, see flowsheet. Due to get OOB and due to void. Slept overnight.

## 2021-10-14 NOTE — PLAN OF CARE
RECEIVING UNIT ED HANDOFF REVIEW    ED Nurse Handoff Report was reviewed by: Isha Long RN on October 14, 2021 at 1:59 AM

## 2021-10-14 NOTE — H&P
[facility]  History and Physical  Hospitalist       Date of Admission:  10/13/2021    Assessment & Plan   [place] is a 66 year old male with hypertension, dyslipidemia, peripheral vascular disease who was admitted to observation on 10/13/2021 for back pain after a fall.     Fall from ladder  T12 compression fracture  Epidural Hematoma  Trauma resulting in back pain and inability to walk independently. Not on any blood thinners or antiplatelet agents. Lumbar Spine MRI: T12 vertebral body acute or subacute fracture with mild compression deformity. Anterior epidural space from T10-T11 through L2-L3 demonstrates possible thin epidural hematoma.    -admit to observation  -ambulate as able  -optimize pain control  -ice/heat for comfort  -scheduled acetaminophen Q 8 hours  -prn toradol  -prn IV/po dilaudid  -Physical Therapy consultation  -Neurosurgery consultation for follow-up epidural hematoma    Hypertension  Dyslipidemia  Managed prior to admission with valsartan-hydrochlorothiazide, statin. Not on aspirin.   -resume PTA regimen once verified  -prn hydralazine    Asymptomatic Rule Out of COVID-19 infection  This patient was evaluated during a global COVID-19 pandemic, which necessitated consideration that the patient might be at risk for infection with the SARS-CoV-2 virus that causes COVID-19. Applicable protocols for evaluation were followed during the patient's care. Low suspicion for infection.   -follow-up COVID-19 PCR test result =>negative    DVT prophylaxis: Pneumatic Compression Devices and Ambulate every shift  Code Status: Full    Disposition: Expected discharge in 1-2 days.    Rebecca Puga MD  Text Page    ~~~~~~~~~~~~~~~~~~~~~~~~~~~~~~~~~~~~~~~~~~~~~~~~~~~~~  Primary Care Physician   Luis Alfredo Villalobos    Chief Complaint   Fall, back pain    History is obtained from the patient and medical records.    History of Present Illness   [place] Marshall is a 66  year old male with hypertension, dyslipidemia, peripheral vascular disease, tobacco abuse who presents with back pain. He was up on the 3rd rung of a step ladder and fell backward onto the ground. Immediate, severe mid-back pain and was unable to get up off of the floor. He did not hit his head or neck. No leg weakness, paresthesias, saddle anesthesia, loss of bowel or bladder function. Case reviewed with Dr. Benito from the Emergency Department. Labs and imaging reviewed. Lumbar Spine MRI: T12 vertebral body acute or subacute fracture with mild compression deformity. Anterior epidural space from T10-T11 through L2-L3 demonstrates possible thin epidural hematoma. Patient was given IV dilaudid, diazepam, toradol, lorazepam.     Past Medical History    I have reviewed this patient's medical history and updated it with pertinent information if needed.   Hypertension  Dyslipidemia  Peripheral Vascular Disease  Tobacco abuse    Past Surgical History   Right iliac artery stenting    Prior to Admission Medications   Prior to Admission Medications   Prescriptions Last Dose Informant Patient Reported? Taking?   rosuvastatin (CRESTOR) 40 MG tablet   Yes Yes   Sig: Take 40 mg by mouth daily   valsartan-hydrochlorothiazide (DIOVAN HCT) 160-12.5 MG tablet   Yes Yes   Sig: Take 1 tablet by mouth daily      Facility-Administered Medications: None     Allergies   No Known Allergies    Social History   I have reviewed this patient's social history and updated it with pertinent information if needed. [place]  has tobacco abuse history. Denies illicit drug use. Alcohol intake 3-4 times/week.     Family History   Mother  from uterine cancer  Father with prostate cancer  Sister with breast cancer    Review of Systems   The 10 point Review of Systems is negative other than noted in the HPI or here.    Physical Exam   Temp: 98.1  F (36.7  C) Temp src: Oral BP: 123/56 Pulse: 61   Resp: 18 SpO2: 99 % O2 Device: None  (Room air)    Vital Signs with Ranges  Temp:  [98.1  F (36.7  C)] 98.1  F (36.7  C)  Pulse:  [61-65] 61  Resp:  [18] 18  BP: (123-148)/(56-73) 123/56  SpO2:  [96 %-99 %] 99 %  200 lbs 0 oz    Constitutional: Alert, NAD, pleasant and interactive  Eyes: PERRL, sclerae anicteric  HEENT: mmm, atraumatic  Respiratory: Lungs CTAB, no wheezes or crackles  Cardiovascular: RRR, no murmurs  no LE edema  GI: soft, non-tender, nondistended  Skin/Integument: warm, dry, no acute rashes  Genitourinary: not examined  Musc: PLUNKETT, normal limb strength x 4  Tender to palpation of para-spinous muscles of thoracic and lumbar levels with no point tenderness over spinous processes   Neuro: AOx3, no focal deficits, no tremors  Psych: not anxious, not confused      Data   Data reviewed today:  I personally reviewed: Lumbar Spine MRI: T12 vertebral body acute or subacute fracture with mild compression deformity. Anterior epidural space from T10-T11 through L2-L3 demonstrates possible thin epidural hematoma.    Recent Labs   Lab 10/13/21  2102   WBC 10.0   HGB 14.7   MCV 93         POTASSIUM 3.9   CHLORIDE 103   CO2 26   BUN 14   CR 0.96   ANIONGAP 5   LIZBETH 8.8   GLC 96       Imaging:  Recent Results (from the past 24 hour(s))   Lumbar spine CT w/o contrast    Narrative    EXAM: CT LUMBAR SPINE W/O CONTRAST  LOCATION: [facility]  DATE/TIME: 10/13/2021 7:25 PM    INDICATION: Low back pain, increased fracture risk  COMPARISON: None.  TECHNIQUE: CT lumbar spine without contrast. Dose reduction techniques performed.    FINDINGS: There are 5 lumbar type vertebral bodies. There is good anatomic alignment of the lumbar spine with no evidence of subluxation. There is acute mild compression fracture of the superior endplate of the T12 vertebral body with approximately 20%   loss of height. There is slight retropulsion of its posterior wall with no significant canal compromise. No acute lumbar spine fracture is  visualized. The rest of the vertebral body heights are maintained. There is schmorls node formation and a   hemangioma involving the L2 vertebral body. The paraspinal soft tissues are unremarkable. There are degenerative changes of the sacroiliac joints bilaterally. There is stenting of the right iliac artery.    T11-T12 and T12 disc space levels have no significant canal compromise or neural foraminal narrowing.    At the L1-L2 disc space level there is a mild loss of height along with endplate changes. There is a diffuse annular bulge which along with the mild facet arthropathy leads to minimal canal compromise. The lateral recesses and the neural foramen are   patent bilaterally.    The L2-L3 and the L4-L5 disc space levels have no significant canal compromise or neural foraminal narrowing.    At the L5-S1 disc space level there is a moderate loss of disc space heights, endplate changes. There is a broad posterior disc osteophyte complex with no evidence of canal compromise. There is facet arthropathy and left-sided uncinate the left-sided   lateral osteophyte formation. This does lead to moderate left neural foraminal narrowing.      Impression    IMPRESSION:  1. Acute mild compression fracture superior endplate T12 vertebral body with approximately 20% loss of height and slight retropulsion of its posterior wall with no evidence of canal compromise.    2.  Good anatomic alignment and lumbar vertebral body heights maintained.    3.  Degenerative disc disease seen primarily at the L1-L2 and the L5-S1 disc space levels as described above.   Lumbar spine MRI w/o contrast    Narrative    EXAM: MR LUMBAR SPINE W/O CONTRAST  LOCATION: [facility]  DATE/TIME: 10/13/2021 10:05 PM    INDICATION: Spine fracture, lumbosacral, traumatic  COMPARISON: CT lumbar spine 10/13/2021  TECHNIQUE: Routine Lumbar Spine MRI without IV contrast.    FINDINGS:   Nomenclature is based on 5 lumbar type vertebral  bodies.     T12 vertebral body acute or subacute fracture with mild compression deformity. Mild superior endplate retropulsion.    Anterior epidural space from T10-T11 through L2-L3 demonstrates possible thin epidural hematoma measuring 2 mm AP dimension.    Left L1 vertebral body small incidental osseous hemangioma with increased T1 signal.    L1 vertebral body right inferior endplate bone marrow edema with sclerosis on CT may reflect underlying type I and type III Modic changes. Post traumatic bone marrow edema also possible. Neoplastic etiology less likely given morphology.    L2 vertebral body demonstrates pronounced patchy sclerosis on CT with a right superior endplate mild central compression deformity or large Schmorl's node. There is pronounced patchy increased STIR signal and decreased T1 signal within the L2 vertebral   body. Findings may reflect acute or subacute fracture. Given pronounced sclerosis and patchy nature of abnormal signal, underlying neoplastic process could appear similar. A short-term interval follow-up MRI to ensure resolution of abnormal signal and   exclude the possibility of underlying neoplasm would be of benefit.    Otherwise, no concerning bone marrow lesions identified. Remaining vertebral heights maintained.    Normal distal spinal cord and cauda equina with conus medullaris at L1.  Unremarkable visualized bony pelvis.  No significant subluxations.    Multilevel degenerative disc disease manifested by disc desiccation, Schmorl's nodes, and endplate osteophytosis. Multilevel facet arthropathy.    T12-L1: Mild bulge.  No significant thecal sac stenosis.  No significant left foraminal stenosis.  No significant right foraminal stenosis.      L1-L2: Bulge. Small superimposed posterior disc extrusion extending mildly above and below the disc margin. Facet ligamentous hypertrophy.  Mild thecal sac stenosis.  No significant left foraminal stenosis.  Moderate right foraminal stenosis.       L2-L3: Mild bulge. Facet ligamentous hypertrophy.  No significant thecal sac stenosis.  No significant left foraminal stenosis.  Mild right foraminal stenosis.      L3-L4: Mild bulge. Facet ligamentous hypertrophy.  Mild to moderate thecal sac stenosis.  No significant left foraminal stenosis.  Mild right foraminal stenosis.      L4-L5: Mild bulge. Facet ligamentous hypertrophy.  No significant thecal sac stenosis.  No significant left foraminal stenosis.  No significant right foraminal stenosis.      L5-S1: Prominent bulge with posterior disc osteophyte ridge. Facet hypertrophy. No significant thecal sac stenosis.  Mild left foraminal stenosis.  Minimal neuroforaminal stenosis.      OTHER:  Left kidney small cysts. No specific follow up recommended.        Impression     IMPRESSION:  T12 vertebral body acute or subacute fracture with mild compression deformity.      Anterior epidural space from T10-T11 through L2-L3 demonstrates possible thin epidural hematoma measuring 2 mm AP dimension.    L1 vertebral body right inferior endplate bone marrow edema with sclerosis on CT may reflect underlying type I and type III Modic changes. Post traumatic bone marrow edema also possible. Neoplastic etiology less likely given morphology.    L2 vertebral body demonstrates pronounced patchy sclerosis on CT with a right superior endplate mild central compression deformity or large Schmorl's node. There is pronounced patchy abnormal signal. Findings may reflect acute or subacute fracture. Given   pronounced sclerosis and patchy nature of abnormal signal, underlying neoplastic process could appear similar. A short-term interval follow-up MRI to ensure resolution of abnormal signal and exclude the possibility of underlying neoplasm would be of   benefit.    Multilevel spondylosis described above.    No critical thecal sac stenosis.

## 2021-10-14 NOTE — PROGRESS NOTES
Patient vital signs are at baseline: Yes  Patient able to ambulate as they were prior to admission or with assist devices provided by therapies during their stay:  Yes  Patient MUST void prior to discharge:  Kathi  Patient able to tolerate oral intake:  Yes  Pain has adequate pain control using Oral analgesics:  Yes  Patient discharged from unit 33 with family via --.  PIV removed and dressed. After visit summary provided and explained, patient and family verbalized understanding. Left the unit with all of his belongings and prescriptions..

## 2021-10-14 NOTE — DISCHARGE SUMMARY
[facility]    Discharge Summary  Hospitalist    Date of Admission:  10/13/2021  Date of Discharge:  10/14/2021  Discharging Provider: Devon Fox MD    Discharge Diagnoses   Principal Problem:    Compression fracture of T12 vertebra  Active Problems:    Epidural hematoma (H)    Fall    Benign essential hypertension    Hyperlipidemia      History of Present Illness   66 year old male with hypertension, dyslipidemia, peripheral vascular disease, tobacco abuse who presents with back pain. He was up on the 3rd rung of a step ladder and fell backward onto the ground. Immediate, severe mid-back pain and was unable to get up off of the floor. He did not hit his head or neck. No leg weakness, paresthesias, saddle anesthesia, loss of bowel or bladder function. Case reviewed with Dr. Benito from the Emergency Department. Labs and imaging reviewed. Lumbar Spine MRI: T12 vertebral body acute or subacute fracture with mild compression deformity. Anterior epidural space from T10-T11 through L2-L3 demonstrates possible thin epidural hematoma. Patient was given IV dilaudid, diazepam, toradol, lorazepam.     Hospital Course   Admitted to observation, seen by PT and felt stable to return home.  He has a walker and will use it at home.  He was given a prescription for Flexeril 10 mg tid prn muscle spasms, and Oxycodone 5 mg q4hour prn pain if needed, otherwise he will use Tylenol PRN, and in 2 days start Aleve 1-2 bid prn, and will hold aspirin for 2 days to minimize risk of worsening of epidural hematoma.  He has no radicular symptoms.     Devon Fox MD  Pager: [phone]  Cell Phone:  [phone]       Significant Results and Procedures   As above    Pending Results   These results will be followed up by Dr. Fox  Unresulted Labs Ordered in the Past 30 Days of this Admission     No orders found for last 31 day(s).          Code Status   Full Code       Primary Care Physician    Luis Alfredo Villalobos    Physical Exam   Temp: 98.4  F (36.9  C) Temp src: Oral BP: 134/56 Pulse: 54   Resp: 16 SpO2: 93 % O2 Device: None (Room air)    Vitals:    10/13/21 1813   Weight: 90.7 kg (200 lb)     Vital Signs with Ranges  Temp:  [98.1  F (36.7  C)-98.4  F (36.9  C)] 98.4  F (36.9  C)  Pulse:  [54-65] 54  Resp:  [16-18] 16  BP: (123-148)/(56-73) 134/56  SpO2:  [93 %-99 %] 93 %  I/O last 3 completed shifts:  In: 300 [P.O.:300]  Out: -     Exam on discharge:   Lungs clear  CV with reg S1S2      Discharge Disposition   Discharged to home  Condition at discharge: Good    Consultations This Hospital Stay   PHYSICAL THERAPY ADULT IP CONSULT  NEUROSURGERY IP CONSULT  CARE MANAGEMENT / SOCIAL WORK IP CONSULT    Time Spent on this Encounter   I spent a total of 25 minutes discharging this patient.     Discharge Orders      Reason for your hospital stay    Fall with T12 vertebral compression fracture, and slight epidural hematoma (slight blood around spinal cord).     Follow-up and recommended labs and tests     Follow up with primary care provider, Luis Alfredo Villalobos, in 10 to 14 days if still having significant pain.     Activity    Your activity upon discharge: minimized bending and standing or prolonged sitting for the next 1-2 weeks, use a walker when up, and 10 lb lifting limit for 2 weeks.     Discharge Instructions    Call Dr. Mcdonald if any medical questions at Cell Phone [phone].     Diet    Follow this diet upon discharge: Orders Placed This Encounter      Regular Diet Adult     Discharge Medications   Current Discharge Medication List      START taking these medications    Details   cyclobenzaprine (FLEXERIL) 10 MG tablet Take 1 tablet (10 mg) by mouth 3 times daily as needed for muscle spasms  Qty: 15 tablet, Refills: 0    Associated Diagnoses: Compression fracture of T12 vertebra, initial encounter (H)      naproxen sodium (ANAPROX) 220 MG tablet Take 1-2 tablets (220-440 mg) by mouth 2  times daily as needed for moderate pain  Refills: 0    Comments: Take with food  Associated Diagnoses: Compression fracture of T12 vertebra, initial encounter (H)      oxyCODONE (ROXICODONE) 5 MG tablet Take 1 tablet (5 mg) by mouth every 6 hours as needed for pain  Qty: 15 tablet, Refills: 0    Associated Diagnoses: Compression fracture of T12 vertebra, initial encounter (H)      senna-docusate (SENOKOT-S/PERICOLACE) 8.6-50 MG tablet Take 1 tablet by mouth 2 times daily as needed for constipation  Refills: 0    Associated Diagnoses: Compression fracture of T12 vertebra, initial encounter (H)         CONTINUE these medications which have CHANGED    Details   aspirin (ECOTRIN LOW STRENGTH) 81 MG EC tablet Take 1 tablet (81 mg) by mouth daily  Refills: 0    Associated Diagnoses: Benign essential hypertension         CONTINUE these medications which have NOT CHANGED    Details   acetaminophen (TYLENOL) 500 MG tablet Take 500-1,000 mg by mouth every 6 hours as needed for mild pain      albuterol (PROAIR HFA/PROVENTIL HFA/VENTOLIN HFA) 108 (90 Base) MCG/ACT inhaler Inhale 1-2 puffs into the lungs every 6 hours as needed for shortness of breath / dyspnea or wheezing      magnesium oxide (MAG-OX) 400 MG tablet Take 400 mg by mouth daily      rosuvastatin (CRESTOR) 40 MG tablet Take 40 mg by mouth daily      valsartan-hydrochlorothiazide (DIOVAN HCT) 160-12.5 MG tablet Take 1 tablet by mouth daily      Vitamin D, Cholecalciferol, 25 MCG (1000 UT) TABS Take 1 tablet by mouth daily           Allergies   No Known Allergies  Data   Most Recent 3 CBC's:Recent Labs   Lab Test 10/13/21  2102   WBC 10.0   HGB 14.7   MCV 93         Most Recent 3 BMP's:  Recent Labs   Lab Test 10/13/21  2102 07/24/17  0915    140   POTASSIUM 3.9 4.8   CHLORIDE 103 106   CO2 26 26   BUN 14 7   CR 0.96 0.72   ANIONGAP 5 8   LIZBETH 8.8 8.4*   GLC 96 86     Most Recent 2 LFT's:  Recent Labs   Lab Test 07/24/17  0915   AST 19   ALT 31   ALKPHOS  83   BILITOTAL 0.5     Most Recent INR's and Anticoagulation Dosing History:  Anticoagulation Dose History     Recent Dosing and Labs Latest Ref Rng & Units 10/14/2021    INR 0.85 - 1.15 1.03        Most Recent 3 Troponin's:  Recent Labs   Lab Test 07/24/17 2005 07/24/17  1322 07/24/17  0915   TROPI <0.015  The 99th percentile for upper reference range is 0.045 ug/L.  Troponin values in   the range of 0.045 - 0.120 ug/L may be associated with risks of adverse   clinical events.   <0.015  The 99th percentile for upper reference range is 0.045 ug/L.  Troponin values in   the range of 0.045 - 0.120 ug/L may be associated with risks of adverse   clinical events.   <0.015  The 99th percentile for upper reference range is 0.045 ug/L.  Troponin values in   the range of 0.045 - 0.120 ug/L may be associated with risks of adverse   clinical events.       Most Recent Cholesterol Panel:  Recent Labs   Lab Test 07/25/17  0615   CHOL 154   LDL 84   HDL 33*   TRIG 185*     Most Recent 6 Bacteria Isolates From Any Culture (See EPIC Reports for Culture Details):No lab results found.  Most Recent TSH, T4 and A1c Labs:  Recent Labs   Lab Test 07/24/17  1322   A1C 5.4

## 2021-10-14 NOTE — PHARMACY-ADMISSION MEDICATION HISTORY
Pharmacy Medication History  Admission medication history interview status for the 10/13/2021  admission is complete. See EPIC admission navigator for prior to admission medications     Location of Interview: Patient room  Medication history sources: Patient and Surescripts    Significant changes made to the medication list:  Added ecotrin, albuterol MDI, vit D, magnesium    In the past week, patient estimated taking medication this percent of the time: greater than 90%    Additional medication history information:   Pt was unsure of doses of vit D/magnesium. Doses of crestor/diovan-hydorchlorothiazide verified in surescripts.    Medication reconciliation completed by provider prior to medication history? No    Time spent in this activity: 20 minutes    Prior to Admission medications    Medication Sig Last Dose Taking? Auth Provider   acetaminophen (TYLENOL) 500 MG tablet Take 500-1,000 mg by mouth every 6 hours as needed for mild pain 10/12/2021 at pm Yes Unknown, Entered By History   albuterol (PROAIR HFA/PROVENTIL HFA/VENTOLIN HFA) 108 (90 Base) MCG/ACT inhaler Inhale 1-2 puffs into the lungs every 6 hours as needed for shortness of breath / dyspnea or wheezing Past Month at Unknown time Yes Unknown, Entered By History   aspirin (ECOTRIN LOW STRENGTH) 81 MG EC tablet Take 81 mg by mouth daily 10/12/2021 at pm Yes Unknown, Entered By History   magnesium oxide (MAG-OX) 400 MG tablet Take 400 mg by mouth daily 10/12/2021 at pm Yes Unknown, Entered By History   rosuvastatin (CRESTOR) 40 MG tablet Take 40 mg by mouth daily 10/12/2021 at pm Yes Unknown, Entered By History   valsartan-hydrochlorothiazide (DIOVAN HCT) 160-12.5 MG tablet Take 1 tablet by mouth daily 10/12/2021 at pm Yes Unknown, Entered By History   Vitamin D, Cholecalciferol, 25 MCG (1000 UT) TABS Take 1 tablet by mouth daily 10/12/2021 at pm Yes Unknown, Entered By History       The information provided in this note is only as accurate as the sources  available at the time of update(s)

## 2021-10-14 NOTE — PROGRESS NOTES
[facility]      OUTPATIENT PHYSICAL THERAPY EVALUATION  PLAN OF TREATMENT FOR OUTPATIENT REHABILITATION  (COMPLETE FOR INITIAL CLAIMS ONLY)  Patient's Last Name, First Name, M.I.  YOB: 1955  [place]                        Provider's Name  [facility] Medical Record No.  [id number]                               Onset Date:  10/13/21   Start of Care Date:  (P) 10/14/21      Type:     _X_PT   ___OT   ___SLP Medical Diagnosis:  (P) s/p fall with T12 vertebral body acute or subacute fracture with mild compression deformity                        PT Diagnosis:  impaired gait   Visits from SOC:  1   _________________________________________________________________________________  Plan of Treatment/Functional Goals    Planned Interventions: bed mobility training, gait training, patient/family education, postural re-education, stair training, transfer training, progressive activity/exercise, risk factor education     Goals: See Physical Therapy Goals on Care Plan in [facility] electronic health record.    Therapy Frequency: One time eval and treatment only  Predicted Duration of Therapy Intervention: 1 day  _________________________________________________________________________________    I CERTIFY THE NEED FOR THESE SERVICES FURNISHED UNDER        THIS PLAN OF TREATMENT AND WHILE UNDER MY CARE     (Physician co-signature of this document indicates review and certification of the therapy plan).                Certification date from: (P) 10/14/21, Certification date to: (P) 10/14/21    Referring Physician: Rebecca Puga MD            Initial Assessment        See Physical Therapy evaluation dated (P) 10/14/21 in Epic electronic health record.

## 2021-10-14 NOTE — ED NOTES
"[facility]  ED Nurse Handoff Report    ED Chief complaint: Fall      ED Diagnosis:   Final diagnoses:   Compression fracture of T12 vertebra, initial encounter (H)   Epidural hematoma (H)       Code Status: TBD by admitting team    Allergies: No Known Allergies    Patient Story: pt was on the ladder and had a mechanical fall from the 3rd rung and landed on the garage floor. Denies head injury or LOC  Focused Assessment:  Back pain at this time, especially when moving. No neuro deficits noted at this time    Treatments and/or interventions provided: IV labs, CT and MRI, pain meds and benzos for MRI  Patient's response to treatments and/or interventions:     To be done/followed up on inpatient unit:  monitor for neuro changes    Does this patient have any cognitive concerns?: AO4    Activity level - Baseline/Home:  Independent  Activity Level - Current:   Stand with assist x2    Patient's Preferred language: English   Needed?: No    Isolation: None and COVID r/o and special precautions  Infection: Not Applicable  COVID r/o and special precautions  Patient tested for COVID 19 prior to admission: YES  Bariatric?: No    Vital Signs:   Vitals:    10/13/21 1813 10/13/21 1922 10/13/21 2322   BP: (!) 148/65 (!) 148/73 123/56   Pulse: 65 63 61   Resp: 18     Temp: 98.1  F (36.7  C)     TempSrc: Oral     SpO2: 96% 96% 99%   Weight: 90.7 kg (200 lb)     Height: 1.803 m (5' 11\")         Cardiac Rhythm:     Was the PSS-3 completed:   Yes  What interventions are required if any?               Family Comments:   OBS brochure/video discussed/provided to patient/family: Yes              Name of person given brochure if not patient:               Relationship to patient:     For the majority of the shift this patient's behavior was Green.   Behavioral interventions performed were very pleasant .    ED NURSE PHONE NUMBER: [id number]           "

## 2021-10-14 NOTE — ED PROVIDER NOTES
"  History     Chief Complaint:  Fall    HPI   [place] is a 66 year old male who presents with back pain.  Patient reports that he was on a ladder and the ladder tipped backwards.  He fell off the third step.  He fell onto his low back.  He denies hitting his head.  Denies any chest pain, shortness breath, abdominal pain.  Denies any other injuries.  Denies any incontinence.  Denies any saddle anesthesia.  Denies any weakness numbness or tingling.  Was unable to get off the floor that so they called medics.  He has not taken anything else for the pain.  Approximately 10 years ago he did break his back.  This feels different from his prior back fracture.    Review of Systems   All other systems reviewed and are negative.      Allergies:  No Known Allergies    Medications:    Crestor   Diovan   Ecotrin     Past Medical History:    Hypertension   Hyperlipidemia   Right upper lobe pulmonary nodule  Anxiety   Hypothyroidism  Insomnia   GERD    Past Surgical History:    Appendectomy   Colonoscopy     Social History:  Presents with wife.  Was at home with grandson     Physical Exam     Patient Vitals for the past 24 hrs:   BP Temp Temp src Pulse Resp SpO2 Height Weight   10/14/21 0125 135/70 98.1  F (36.7  C) Oral 61 16 97 % -- --   10/13/21 2322 123/56 -- -- 61 -- 99 % -- --   10/13/21 1922 (!) 148/73 -- -- 63 -- 96 % -- --   10/13/21 1813 (!) 148/65 98.1  F (36.7  C) Oral 65 18 96 % 1.803 m (5' 11\") 90.7 kg (200 lb)       Physical Exam  General: Resting on the bed.  Head: No obvious trauma to head.  Ears, Nose, Throat:  External ears normal.  Nose normal.  Clear TMs  Eyes:  Conjunctivae clear.  Pupils are equal, round, and reactive.   Neck: Normal range of motion.  Neck supple.  non tender spine  CV: Regular rate and rhythm.  No murmurs.      Respiratory: Effort normal and breath sounds normal.  No wheezing or crackles.   Gastrointestinal: Soft.  No distension. There is no tenderness.   Musculoskeletal: " Tender to palpation of the bilateral paraspinous muscles of the lumbar spine.  No tenderness to palpation of the cervical, thoracic, or lumbar spine without step-off or deformity.  Remainder of extremity exams are nontender to palpation, full range of motion.    Neuro: Alert. Moving all extremities appropriately.  Normal speech.  No saddle anesthesia.  5 out of 5 dorsi and plantar flexion.  Sensation intact to light touch.  2+ DP pulses.  Skin: Skin is warm and dry.  No rash noted.     Emergency Department Course     Imaging:  Lumbar spine MRI w/o contrast   Final Result    IMPRESSION:   T12 vertebral body acute or subacute fracture with mild compression deformity.        Anterior epidural space from T10-T11 through L2-L3 demonstrates possible thin epidural hematoma measuring 2 mm AP dimension.      L1 vertebral body right inferior endplate bone marrow edema with sclerosis on CT may reflect underlying type I and type III Modic changes. Post traumatic bone marrow edema also possible. Neoplastic etiology less likely given morphology.      L2 vertebral body demonstrates pronounced patchy sclerosis on CT with a right superior endplate mild central compression deformity or large Schmorl's node. There is pronounced patchy abnormal signal. Findings may reflect acute or subacute fracture. Given    pronounced sclerosis and patchy nature of abnormal signal, underlying neoplastic process could appear similar. A short-term interval follow-up MRI to ensure resolution of abnormal signal and exclude the possibility of underlying neoplasm would be of    benefit.      Multilevel spondylosis described above.      No critical thecal sac stenosis.         Lumbar spine CT w/o contrast   Final Result   IMPRESSION:   1. Acute mild compression fracture superior endplate T12 vertebral body with approximately 20% loss of height and slight retropulsion of its posterior wall with no evidence of canal compromise.      2.  Good anatomic  alignment and lumbar vertebral body heights maintained.      3.  Degenerative disc disease seen primarily at the L1-L2 and the L5-S1 disc space levels as described above.          Laboratory:  Labs Ordered and Resulted from Time of ED Arrival Up to the Time of Departure from the ED   CBC WITH PLATELETS AND DIFFERENTIAL - Abnormal; Notable for the following components:       Result Value    Absolute Immature Granulocytes 0.1 (*)     All other components within normal limits   BASIC METABOLIC PANEL - Normal   INR - Normal   PARTIAL THROMBOPLASTIN TIME - Normal   COVID-19 VIRUS (CORONAVIRUS) BY PCR - Normal    Narrative:     Testing was performed using the greta  SARS-CoV-2 & Influenza A/B Assay on the greta  [employer]  System.  This test should be ordered for the detection of SARS-COV-2 in individuals who meet SARS-CoV-2 clinical and/or epidemiological criteria. Test performance is unknown in asymptomatic patients.  This test is for in vitro diagnostic use under the FDA EUA for laboratories certified under CLIA to perform moderate and/or high complexity testing. This test has not been FDA cleared or approved.  A negative test does not rule out the presence of PCR inhibitors in the specimen or target RNA in concentration below the limit of detection for the assay. The possibility of a false negative should be considered if the patient's recent exposure or clinical presentation suggests COVID-19.  [facility] are certified under the Clinical Laboratory Improvement Amendments of 1988 (CLIA-88) as qualified to perform moderate and/or high complexity laboratory testing.   CBC WITH PLATELETS & DIFFERENTIAL    Narrative:     The following orders were created for panel order CBC with platelets differential.  Procedure                               Abnormality         Status                     ---------                               -----------         ------                     CBC with platelets and  d...[[id number]]  Abnormal            Final result                 Please view results for these tests on the individual orders.       Emergency Department Course:    Reviewed:  I reviewed nursing notes, vitals, past history and care everywhere    Assessments:  2010 I obtained history and examined the patient as noted above.   2358 I rechecked the patient and explained findings.     Consults:   2030 I spoke with neurosurgery regarding patient's presentation, findings, and plan of care.   I spoke again with neurosurgery regarding MRI     Interventions:  Medications   melatonin tablet 1 mg (has no administration in time range)   ondansetron (ZOFRAN-ODT) ODT tab 4 mg (has no administration in time range)     Or   ondansetron (ZOFRAN) injection 4 mg (has no administration in time range)   acetaminophen (TYLENOL) tablet 975 mg (has no administration in time range)   HYDROmorphone (DILAUDID) tablet 2 mg (has no administration in time range)   HYDROmorphone (PF) (DILAUDID) injection 0.5 mg (has no administration in time range)   senna-docusate (SENOKOT-S/PERICOLACE) 8.6-50 MG per tablet 1 tablet (has no administration in time range)     Or   senna-docusate (SENOKOT-S/PERICOLACE) 8.6-50 MG per tablet 2 tablet (has no administration in time range)   polyethylene glycol (MIRALAX) Packet 17 g (has no administration in time range)   prochlorperazine (COMPAZINE) injection 5 mg (has no administration in time range)     Or   prochlorperazine (COMPAZINE) tablet 5 mg (has no administration in time range)     Or   prochlorperazine (COMPAZINE) suppository 12.5 mg (has no administration in time range)   ketorolac (TORADOL) tablet 10 mg (has no administration in time range)   cyclobenzaprine (FLEXERIL) tablet 10 mg (has no administration in time range)   hydrALAZINE (APRESOLINE) injection 10 mg (has no administration in time range)   ketorolac (TORADOL) injection 15 mg (15 mg Intramuscular Given 10/13/21 1922)   diazepam (VALIUM)  injection 5 mg (5 mg Intramuscular Given 10/13/21 1921)   HYDROmorphone (PF) (DILAUDID) injection 0.5 mg (0.5 mg Intravenous Given 10/13/21 2103)   LORazepam (ATIVAN) injection 1 mg (1 mg Intravenous Given 10/13/21 2144)       Disposition:  The patient was admitted to the hospital under the care of Dr. Puga.    Impression & Plan      Medical Decision Makin-year-old male presents after a fall.  Vital signs reassuring.  Broad differential pursued include not limited to compression fracture, sprain strain, subluxation, cauda equina, spinal cord injury, etc.  Patient is otherwise well-appearing nontoxic.  He has no midline tenderness palpation but tenderness over the bilateral lower back.  No saddle anesthesia.  No signs of cauda equina.  CT does show a mild compression fracture at T12.  Spoke with neurosurgery recommended MRI.  MRI confirms the compression fracture but also shows a small epidural hematoma.  Neurosurgery recommended admission for observation.  Basic labs are unrevealing.  Pain medications were given.  Patient is comfortable.  Patient is neuro intact at this time.  No indication for emergent surgical procedure.  Patient admitted to the hospitalist service.    Diagnosis:    ICD-10-CM    1. Compression fracture of T12 vertebra, initial encounter (H)  S22.080A    2. Epidural hematoma (H)  S06.4X9A        Discharge Medications:  Current Discharge Medication List          Scribe Disclosure:  I, Devon Rodriguez, am serving as a scribe at 8:57 PM on 10/13/2021 to document services personally performed by Yuliana Benito MD based on my observations and the provider's statements to me.      Yuliana Benito MD  10/14/21 1134
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
## 2021-10-14 NOTE — PROGRESS NOTES
10/14/21 0309   Quick Adds   Quick Adds Certification   Type of Visit Initial PT Evaluation   Living Environment   People in home spouse   Current Living Arrangements house   Home Accessibility stairs to enter home;stairs within home   Number of Stairs, Main Entrance 2   Stair Railings, Main Entrance   (reports has a post)   Number of Stairs, Within Home, Primary greater than 10 stairs   Stair Railings, Within Home, Primary railing on right side (ascending)   Transportation Anticipated family or friend will provide   Living Environment Comments bedroom on 2nd level but could stay on main level   Self-Care   Usual Activity Tolerance good   Current Activity Tolerance fair  (limited by back pain)   Regular Exercise No   Equipment Currently Used at Home none   Activity/Exercise/Self-Care Comment pt IND with all mobility at home, drives   Disability/Function   Hearing Difficulty or Deaf no   Fall history within last six months yes  (fell off ladder)   Number of times patient has fallen within last six months 1   Change in Functional Status Since Onset of Current Illness/Injury yes   General Information   Onset of Illness/Injury or Date of Surgery 10/13/21   Referring Physician Rebecca Puga MD   Patient/Family Therapy Goals Statement (PT) to go home today   Pertinent History of Current Problem (include personal factors and/or comorbidities that impact the POC) 66 year old male with hypertension, dyslipidemia, peripheral vascular disease, tobacco abuse who presents with back pain. He was up on the 3rd rung of a step ladder and fell backward onto the ground. Immediate, severe mid-back pain and was unable to get up off of the floor. He did not hit his head or neck. No leg weakness, paresthesias, saddle anesthesia, loss of bowel or bladder function. Found to have a T12 vertebral body acute or subacute fracture with mild compression deformity.  Per orders: up ad oumar   Existing Precautions/Restrictions spinal    General Observations resting in bed, NAD, reports has not been out of bed since admission   Cognition   Orientation Status (Cognition) oriented x 3   Affect/Mental Status (Cognition) WNL   Follows Commands (Cognition) WNL   Pain Assessment   Patient Currently in Pain Yes, see Vital Sign flowsheet  (low back pain)   Integumentary/Edema   Integumentary/Edema no deficits were identifed   Range of Motion (ROM)   ROM Comment limited trunk mobility due to increased back pain. able to move ext x 4 against gravity. formal MMT not tested due to back pain with movements   Bed Mobility   Comment (Bed Mobility) supine to sit with SBA-log rolling   Transfers   Transfer Safety Comments sit to stand with min A and FWW, guarded and increased effort due to back pain   Gait/Stairs (Locomotion)   [place] Level (Gait) minimum assist (75% patient effort)   Assistive Device (Gait) walker, front-wheeled   Distance in Feet (Required for LE Total Joints) 10   Pattern (Gait)   (partial step through)   Comment (Gait/Stairs) heavy UE support on the FWW to offload back   Balance   Balance Comments min A with standing with FWW support   Sensory Examination   Sensory Perception Comments intact to light touch bilateral LEs, pt denies any numbness/tingling   Clinical Impression   Criteria for Skilled Therapeutic Intervention yes, treatment indicated   PT Diagnosis (PT) impaired gait   Influenced by the following impairments back pain, decreased activity tolerance   Functional limitations due to impairments s/p fall with T12 vertebral body fracture   Clinical Presentation Stable/Uncomplicated   Clinical Presentation Rationale clinical judgement   Clinical Decision Making (Complexity) low complexity   Therapy Frequency (PT) One time eval and treatment only   Predicted Duration of Therapy Intervention (days/wks) 1 day   Planned Therapy Interventions (PT) bed mobility training;gait training;patient/family education;postural re-education;stair  training;transfer training;progressive activity/exercise;risk factor education   Anticipated Equipment Needs at Discharge (PT) walker, rolling  (pt reports will check if he has one available at home)   Risk & Benefits of therapy have been explained evaluation/treatment results reviewed;care plan/treatment goals reviewed;risks/benefits reviewed;current/potential barriers reviewed;participants voiced agreement with care plan;participants included;patient   PT Discharge Planning    PT Discharge Recommendation (DC Rec) home with assist   PT Rationale for DC Rec Pt currently requires SBA to min A with mobility due to increased back pain with movements. Currently recommend FWW for increased support due to pain. Pt reports his wife can assist him as needed. Pt reports plans to check if he has a FWW available, otherwise recommend issued one prior to d/c.    PT Brief overview of current status  d/c PT   Therapy Certification   Start of care date 10/14/21   Certification date from 10/14/21   Certification date to 10/14/21   Medical Diagnosis s/p fall with T12 vertebral body acute or subacute fracture with mild compression deformity   Total Evaluation Time   Total Evaluation Time (Minutes) 10

## 2021-10-18 DIAGNOSIS — S22.080A COMPRESSION FRACTURE OF T12 VERTEBRA, INITIAL ENCOUNTER (H): Primary | ICD-10-CM

## 2021-10-31 ENCOUNTER — APPOINTMENT (OUTPATIENT)
Dept: GENERAL RADIOLOGY | Facility: CLINIC | Age: 66
End: 2021-10-31
Attending: EMERGENCY MEDICINE
Payer: COMMERCIAL

## 2021-10-31 ENCOUNTER — HOSPITAL ENCOUNTER (EMERGENCY)
Facility: CLINIC | Age: 66
Discharge: HOME | End: 2021-10-31
Attending: EMERGENCY MEDICINE | Admitting: EMERGENCY MEDICINE
Payer: COMMERCIAL

## 2021-10-31 VITALS
SYSTOLIC BLOOD PRESSURE: 141 MMHG | BODY MASS INDEX: 29.4 KG/M2 | OXYGEN SATURATION: 98 % | HEIGHT: 71 IN | WEIGHT: 210 LBS | TEMPERATURE: 98.1 F | HEART RATE: 76 BPM | DIASTOLIC BLOOD PRESSURE: 72 MMHG | RESPIRATION RATE: 16 BRPM

## 2021-10-31 DIAGNOSIS — K59.01 SLOW TRANSIT CONSTIPATION: ICD-10-CM

## 2021-10-31 LAB
ALBUMIN UR-MCNC: 20 MG/DL
ANION GAP SERPL CALCULATED.3IONS-SCNC: 7 MMOL/L (ref 3–14)
APPEARANCE UR: CLEAR
BACTERIA #/AREA URNS HPF: ABNORMAL /HPF
BASOPHILS # BLD AUTO: 0 10E3/UL (ref 0–0.2)
BASOPHILS NFR BLD AUTO: 0 %
BILIRUB UR QL STRIP: NEGATIVE
BUN SERPL-MCNC: 10 MG/DL (ref 7–30)
CALCIUM SERPL-MCNC: 8.9 MG/DL (ref 8.5–10.1)
CHLORIDE BLD-SCNC: 101 MMOL/L (ref 94–109)
CO2 SERPL-SCNC: 22 MMOL/L (ref 20–32)
COLOR UR AUTO: YELLOW
CREAT SERPL-MCNC: 0.72 MG/DL (ref 0.66–1.25)
EOSINOPHIL # BLD AUTO: 0.1 10E3/UL (ref 0–0.7)
EOSINOPHIL NFR BLD AUTO: 1 %
ERYTHROCYTE [DISTWIDTH] IN BLOOD BY AUTOMATED COUNT: 11.9 % (ref 10–15)
GFR SERPL CREATININE-BSD FRML MDRD: >90 ML/MIN/1.73M2
GLUCOSE BLD-MCNC: 84 MG/DL (ref 70–99)
GLUCOSE UR STRIP-MCNC: NEGATIVE MG/DL
HCT VFR BLD AUTO: 43.1 % (ref 40–53)
HGB BLD-MCNC: 15.2 G/DL (ref 13.3–17.7)
HGB UR QL STRIP: NEGATIVE
HYALINE CASTS: 10 /LPF
IMM GRANULOCYTES # BLD: 0 10E3/UL
IMM GRANULOCYTES NFR BLD: 0 %
KETONES UR STRIP-MCNC: 40 MG/DL
LEUKOCYTE ESTERASE UR QL STRIP: NEGATIVE
LYMPHOCYTES # BLD AUTO: 1.6 10E3/UL (ref 0.8–5.3)
LYMPHOCYTES NFR BLD AUTO: 23 %
MCH RBC QN AUTO: 32.2 PG (ref 26.5–33)
MCHC RBC AUTO-ENTMCNC: 35.3 G/DL (ref 31.5–36.5)
MCV RBC AUTO: 91 FL (ref 78–100)
MONOCYTES # BLD AUTO: 0.5 10E3/UL (ref 0–1.3)
MONOCYTES NFR BLD AUTO: 6 %
MUCOUS THREADS #/AREA URNS LPF: PRESENT /LPF
NEUTROPHILS # BLD AUTO: 5 10E3/UL (ref 1.6–8.3)
NEUTROPHILS NFR BLD AUTO: 70 %
NITRATE UR QL: NEGATIVE
NRBC # BLD AUTO: 0 10E3/UL
NRBC BLD AUTO-RTO: 0 /100
PH UR STRIP: 6 [PH] (ref 5–7)
PLATELET # BLD AUTO: 229 10E3/UL (ref 150–450)
POTASSIUM BLD-SCNC: 3.8 MMOL/L (ref 3.4–5.3)
RBC # BLD AUTO: 4.72 10E6/UL (ref 4.4–5.9)
RBC URINE: 2 /HPF
SODIUM SERPL-SCNC: 130 MMOL/L (ref 133–144)
SP GR UR STRIP: 1.02 (ref 1–1.03)
SPERM #/AREA URNS HPF: PRESENT /HPF
SQUAMOUS EPITHELIAL: 1 /HPF
UROBILINOGEN UR STRIP-MCNC: 4 MG/DL
WBC # BLD AUTO: 7.2 10E3/UL (ref 4–11)
WBC URINE: 6 /HPF

## 2021-10-31 PROCEDURE — 250N000013 HC RX MED GY IP 250 OP 250 PS 637: Performed by: EMERGENCY MEDICINE

## 2021-10-31 PROCEDURE — 85025 COMPLETE CBC W/AUTO DIFF WBC: CPT | Performed by: EMERGENCY MEDICINE

## 2021-10-31 PROCEDURE — 80048 BASIC METABOLIC PNL TOTAL CA: CPT | Performed by: EMERGENCY MEDICINE

## 2021-10-31 PROCEDURE — 81001 URINALYSIS AUTO W/SCOPE: CPT | Performed by: EMERGENCY MEDICINE

## 2021-10-31 PROCEDURE — 74019 RADEX ABDOMEN 2 VIEWS: CPT

## 2021-10-31 PROCEDURE — 96361 HYDRATE IV INFUSION ADD-ON: CPT

## 2021-10-31 PROCEDURE — 72170 X-RAY EXAM OF PELVIS: CPT

## 2021-10-31 PROCEDURE — 36415 COLL VENOUS BLD VENIPUNCTURE: CPT | Performed by: EMERGENCY MEDICINE

## 2021-10-31 PROCEDURE — 258N000003 HC RX IP 258 OP 636: Performed by: EMERGENCY MEDICINE

## 2021-10-31 PROCEDURE — 99284 EMERGENCY DEPT VISIT MOD MDM: CPT | Mod: 25

## 2021-10-31 PROCEDURE — 96360 HYDRATION IV INFUSION INIT: CPT

## 2021-10-31 RX ORDER — MAGNESIUM CARB/ALUMINUM HYDROX 105-160MG
296 TABLET,CHEWABLE ORAL ONCE
Status: COMPLETED | OUTPATIENT
Start: 2021-10-31 | End: 2021-10-31

## 2021-10-31 RX ORDER — MINERAL OIL 100 G/100G
1 OIL RECTAL ONCE
Status: DISCONTINUED | OUTPATIENT
Start: 2021-10-31 | End: 2021-10-31 | Stop reason: HOSPADM

## 2021-10-31 RX ORDER — POLYETHYLENE GLYCOL 3350 17 G/17G
17 POWDER, FOR SOLUTION ORAL DAILY
Status: DISCONTINUED | OUTPATIENT
Start: 2021-10-31 | End: 2021-10-31 | Stop reason: HOSPADM

## 2021-10-31 RX ORDER — LACTULOSE 10 G/15ML
10 SOLUTION ORAL ONCE
Status: COMPLETED | OUTPATIENT
Start: 2021-10-31 | End: 2021-10-31

## 2021-10-31 RX ADMIN — MAGNESIUM CITRATE 296 ML: 1.75 LIQUID ORAL at 12:24

## 2021-10-31 RX ADMIN — POLYETHYLENE GLYCOL 3350 17 G: 17 POWDER, FOR SOLUTION ORAL at 12:26

## 2021-10-31 RX ADMIN — SODIUM CHLORIDE 1000 ML: 9 INJECTION, SOLUTION INTRAVENOUS at 11:25

## 2021-10-31 RX ADMIN — LACTULOSE 10 G: 10 SOLUTION ORAL at 15:03

## 2021-10-31 RX ADMIN — DOCUSATE SODIUM 286 ML: 50 LIQUID ORAL at 13:01

## 2021-10-31 ASSESSMENT — ENCOUNTER SYMPTOMS
APPETITE CHANGE: 1
ABDOMINAL PAIN: 1
DIFFICULTY URINATING: 0
FEVER: 0
ABDOMINAL DISTENTION: 1
DYSURIA: 0
CONSTIPATION: 1

## 2021-10-31 ASSESSMENT — MIFFLIN-ST. JEOR: SCORE: 1749.68

## 2021-10-31 NOTE — ED NOTES
Pt able to have small BM. Pt declining another enema. Pt and wife reports ready for discharge. Provider updated.

## 2021-10-31 NOTE — ED PROVIDER NOTES
History   Chief Complaint:  Constipation       The history is provided by the patient.      North Marshall is a 66 year old male with history of fall and vertebral fracture who presents for evaluation of constipation and abdominal pain. He reports a visit to the emergency department on 10/13/21 when he had a compression fracture in his spine. Since this time he has been taking muscle relaxers, oxycontin, and stool softeners. The patient reports constipation for the last 18 days. He notes some liquid stool, but no bowel movements since the 13th. He also reports lower right abdominal pain and feeling distended, as well as not wanting to eat anything. He notes groin pain when straining. The patient's wife reports that they saw his primary two days ago and he had three enemas and was told to try more Miralax, but if this did not help they were instructed to come to the ED. The patient denies any fevers or urinary symptoms.     Review of Systems   Constitutional: Positive for appetite change. Negative for fever.   Gastrointestinal: Positive for abdominal distention, abdominal pain and constipation.   Genitourinary: Negative for difficulty urinating and dysuria.   All other systems reviewed and are negative.      Allergies:  The patient has no known allergies.     Medications:  Tylenol  Albuterol  Flexeril  Anaprox  Crestor   Senna-docusate  OxyContin  Vitamin D  Aspirin  Mag-ox  Diovan HCT    Past Medical History:     Epidural hematoma  Hypertension  Hyperlipidemia  Compression fracture of T12 vertebra    Social History:  The patient presents with his wife.    He recently saw his primary care provider.     Physical Exam     Patient Vitals for the past 24 hrs:   BP Temp Temp src Pulse Resp SpO2 Height Weight   10/31/21 1300 (!) 141/72 -- -- 76 -- -- -- --   10/31/21 1230 132/67 -- -- 71 -- -- -- --   10/31/21 1225 -- -- -- -- -- 98 % -- --   10/31/21 1215 -- -- -- -- -- 95 % -- --   10/31/21 1145 -- -- -- -- -- 94  "% -- --   10/31/21 1130 121/59 -- -- 62 -- 96 % -- --   10/31/21 1125 122/69 -- -- 66 -- 95 % -- --   10/31/21 1017 93/63 98.1  F (36.7  C) Temporal 72 16 99 % 1.803 m (5' 11\") 95.3 kg (210 lb)       Physical Exam  Nursing note and vitals reviewed.    Constitutional:  Appears comfortable.    HENT:    Nose normal.  No discharge.      Oral mucosa is dry.  Eyes:    Conjunctivae are normal without injection.  Pupils are equal.  Lymph:  No enlarged or tender cervical or submandibular lymph nodes.   Cardiovascular:  Normal rate, regular rhythm with normal S1 and S2.      Normal heart sounds and peripheral pulses 2+ and equal.       No murmur or deisi.  Pulmonary:  Effort normal and breath sounds clear to auscultation bilaterally.     No respiratory distress.  No stridor.     No wheezes. No rales.     GI:    Mild diffuse tenderness with some increased pain in the right lower quadrant.      Soft. No distension and no mass.     No rebound and no guarding. No flank pain.  No HSM.  :   Rectal exam revealed no stool, tenderness, or mass.   Musculoskeletal:  Normal range of motion. No extremity deformity.     No edema and no tenderness.    Neurological:   Alert and oriented. No focal weakness.     Exhibits good muscle tone. Coordination normal.      GCS eye subscore is 4. GCS verbal subscore is 5.      GCS motor subscore is 6.   Skin:    Skin is warm and dry. No rash noted. No diaphoresis.      No erythema. No pallor.  No lesions.  Psychiatric:   Behavior is normal. Appropriate mood and affect.     Judgment and thought content normal.       Emergency Department Course     Imaging:  Abdomen XR, 2 vw, flat and upright   Final Result   IMPRESSION: Moderate stool burden. Nonobstructive bowel gas pattern. Right iliac vascular stent. No intraperitoneal free air.       XR Pelvis 1/2 Views   Final Result   IMPRESSION: No acute fracture. Minimal degenerative arthritis of both hips. Degenerative changes in lumbar spine. Arterial " calcifications and right iliac stent.      Report per radiology    Laboratory:  Labs Ordered and Resulted from Time of ED Arrival to Time of ED Departure   BASIC METABOLIC PANEL - Abnormal       Result Value    Sodium 130 (*)     Potassium 3.8      Chloride 101      Carbon Dioxide (CO2) 22      Anion Gap 7      Urea Nitrogen 10      Creatinine 0.72      Calcium 8.9      Glucose 84      GFR Estimate >90     ROUTINE UA WITH MICROSCOPIC REFLEX TO CULTURE - Abnormal    Color Urine Yellow      Appearance Urine Clear      Glucose Urine Negative      Bilirubin Urine Negative      Ketones Urine 40  (*)     Specific Gravity Urine 1.020      Blood Urine Negative      pH Urine 6.0      Protein Albumin Urine 20  (*)     Urobilinogen Urine 4.0 (*)     Nitrite Urine Negative      Leukocyte Esterase Urine Negative      Bacteria Urine Few (*)     Mucus Urine Present (*)     Sperm Urine Present (*)     RBC Urine 2      WBC Urine 6 (*)     Squamous Epithelials Urine 1      Hyaline Casts Urine 10 (*)    CBC WITH PLATELETS AND DIFFERENTIAL    WBC Count 7.2      RBC Count 4.72      Hemoglobin 15.2      Hematocrit 43.1      MCV 91      MCH 32.2      MCHC 35.3      RDW 11.9      Platelet Count 229      % Neutrophils 70      % Lymphocytes 23      % Monocytes 6      % Eosinophils 1      % Basophils 0      % Immature Granulocytes 0      NRBCs per 100 WBC 0      Absolute Neutrophils 5.0      Absolute Lymphocytes 1.6      Absolute Monocytes 0.5      Absolute Eosinophils 0.1      Absolute Basophils 0.0      Absolute Immature Granulocytes 0.0      Absolute NRBCs 0.0          Emergency Department Course:  Reviewed:  I reviewed nursing notes, vitals, past medical history and Care Everywhere    Assessments:  1108 I obtained history and examined the patient as noted above.   1423 I rechecked the patient and explained findings. He feels unchanged after the first enema.  1556 I rechecked and updated the patient. At this point I feel that the patient  is safe for discharge, and the patient agrees.     Interventions:  1125 NS 1 L IV  1224 Magnesium citrate 296 mL Oral  1226 Miralax 17 g Oral  1301 Pink lady enema 286 mL Rectal  1503 Lactulose 10 g Oral    Disposition:  The patient was discharged to home.     Impression & Plan     Medical Decision Making:  Patient comes in with constipation from pain meds he took after he had fallen and had a compression fracture of his thoracic spine.  X-ray here confirms the constipation without evidence of free air.  Labs were unremarkable.  He was given MiraLAX, magnesium citrate, and a pink lady enema.  He had a small amount of stool from that.  He was then given lactulose and had a decent stool but it certainly was not real large.  He felt somewhat better but wants to go home and finish cleaning out.  I gave him instructions on what to do at home and he will contact his doctor with an update tomorrow.  He is off of his pain medication which is good.    Push fluids including Gatorade, MiraLAX 3-4 times a day, take the stool softener twice a day, tomorrow in the morning if you have not had a good result, take another bottle of magnesium citrate.  Apple juice and prune juice are also good.  Call your doctor with an update tomorrow.  Once you feel like you are cleaned out, decrease your MiraLAX to 1-2 times a day and continue the stool softener.  You can gradually over the next 1 to 2 weeks taper off of both of these if able.    Diagnosis:    ICD-10-CM    1. Slow transit constipation  K59.01        Discharge Medications:  Discharge Medication List as of 10/31/2021  4:11 PM          Scribe Disclosure:  I, Michelle Ramirez, am serving as a scribe at 11:06 AM on 10/31/2021 to document services personally performed by Anastasiia Grimes MD based on my observations and the provider's statements to me.        Anastasiia Grimes MD  10/31/21 2004

## 2021-10-31 NOTE — DISCHARGE INSTRUCTIONS
Push fluids including Gatorade, MiraLAX 3-4 times a day, take the stool softener twice a day, tomorrow in the morning if you have not had a good result, take another bottle of magnesium citrate.  Apple juice and prune juice are also good.  Call your doctor with an update tomorrow.  Once you feel like you are cleaned out, decrease your MiraLAX to 1-2 times a day and continue the stool softener.  You can gradually over the next 1 to 2 weeks taper off of both of these if able.

## 2021-10-31 NOTE — ED NOTES
Pt states he was able to hold enema for approximately 8 minutes, only had tiny small bowel movement and otherwise states he does not feel the urge to have BM again

## 2021-11-18 ENCOUNTER — ANCILLARY PROCEDURE (OUTPATIENT)
Dept: GENERAL RADIOLOGY | Facility: CLINIC | Age: 66
End: 2021-11-18
Attending: PHYSICIAN ASSISTANT
Payer: COMMERCIAL

## 2021-11-18 ENCOUNTER — OFFICE VISIT (OUTPATIENT)
Dept: NEUROSURGERY | Facility: CLINIC | Age: 66
End: 2021-11-18
Attending: PHYSICIAN ASSISTANT
Payer: COMMERCIAL

## 2021-11-18 VITALS
HEIGHT: 71 IN | DIASTOLIC BLOOD PRESSURE: 61 MMHG | HEART RATE: 60 BPM | SYSTOLIC BLOOD PRESSURE: 93 MMHG | BODY MASS INDEX: 29.4 KG/M2 | WEIGHT: 210 LBS | OXYGEN SATURATION: 98 %

## 2021-11-18 DIAGNOSIS — S22.080A COMPRESSION FRACTURE OF T12 VERTEBRA, INITIAL ENCOUNTER (H): Primary | ICD-10-CM

## 2021-11-18 DIAGNOSIS — S22.080A COMPRESSION FRACTURE OF T12 VERTEBRA, INITIAL ENCOUNTER (H): ICD-10-CM

## 2021-11-18 LAB — RADIOLOGIST FLAGS: ABNORMAL

## 2021-11-18 PROCEDURE — G0463 HOSPITAL OUTPT CLINIC VISIT: HCPCS

## 2021-11-18 PROCEDURE — 72080 X-RAY EXAM THORACOLMB 2/> VW: CPT | Performed by: RADIOLOGY

## 2021-11-18 PROCEDURE — 99213 OFFICE O/P EST LOW 20 MIN: CPT | Performed by: PHYSICIAN ASSISTANT

## 2021-11-18 RX ORDER — DIAZEPAM 5 MG
5 TABLET ORAL ONCE
Qty: 1 TABLET | Refills: 0 | Status: SHIPPED | OUTPATIENT
Start: 2021-11-18 | End: 2021-11-18

## 2021-11-18 ASSESSMENT — PAIN SCALES - GENERAL: PAINLEVEL: MODERATE PAIN (5)

## 2021-11-18 ASSESSMENT — MIFFLIN-ST. JEOR: SCORE: 1749.68

## 2021-11-18 NOTE — LETTER
11/18/2021         RE: North Marshall  [address] [id number]        Dear Colleague,    Thank you for referring your patient, North Marshall, to the [facility] [place]. Please see a copy of my visit note below.    Neurosurgery follow-up    Mr. Marshall is a 66-year-old male who had fallen just over a month ago and was found to have a T12.  Compression fracture on MRI.  He was also found to have abnormality in the L2 vertebral body and recommended follow-up in short interval.  Today he states he is getting a little bit better but he still has significant pain in his low back.  He is reported about a 20 pound weight loss, he attributes this to constipation and loss of appetite due to taking pain medications.  He also feels quite fatigued in general is not been sleeping well because mostly of his back pain.  He denies any radicular symptoms or numbness or weakness in his lower extremities.  He was offered a brace at the time of his initial encounter but declined it and was using a soft elastic brace without much effect and today is interested in obtaining a more rigid brace.    Exam    B/P: 93/61, T: Data Unavailable, P: 60, R: Data Unavailable     Alert and oriented no acute distress  Thoracic and lumbar spine tender to palpation approximately mid thoracic, lumbar junction region.  Bilateral lower extremities appropriate strength  Gait is normal  Walking with a cane    Imaging    Lumbar x-ray demonstrates Further compression and anterior wedging of the T12  vertebral body now with loss of approximately 60% anterior vertebral  body height. This has progressed since prior CT 10/13/2021, although  the T12 vertebral body fracture was present at that time.    Assessment    T12 compression fracture  L2 vertebral body abnormality      Plan    I would recommend the patient repeat his lumbar MRI with and without contrast to evaluate the L2 vertebral body to rule  out any concerning signs of neoplasm.  This will also give us further information on the continued healing or lack thereof of the T12 compression fracture.  I will follow-up with him with those results when they are available.  I also put in an order for him to obtain a TLSO off-the-shelf brace.      Total time of 30 minutes spent with the patient today in counseling and coordination of care.        Again, thank you for allowing me to participate in the care of your patient.        Sincerely,        Bryce Bryan PA-C

## 2021-11-18 NOTE — PROGRESS NOTES
Neurosurgery follow-up    Mr. Marshall is a 66-year-old male who had fallen just over a month ago and was found to have a T12.  Compression fracture on MRI.  He was also found to have abnormality in the L2 vertebral body and recommended follow-up in short interval.  Today he states he is getting a little bit better but he still has significant pain in his low back.  He is reported about a 20 pound weight loss, he attributes this to constipation and loss of appetite due to taking pain medications.  He also feels quite fatigued in general is not been sleeping well because mostly of his back pain.  He denies any radicular symptoms or numbness or weakness in his lower extremities.  He was offered a brace at the time of his initial encounter but declined it and was using a soft elastic brace without much effect and today is interested in obtaining a more rigid brace.    Exam    B/P: 93/61, T: Data Unavailable, P: 60, R: Data Unavailable     Alert and oriented no acute distress  Thoracic and lumbar spine tender to palpation approximately mid thoracic, lumbar junction region.  Bilateral lower extremities appropriate strength  Gait is normal  Walking with a cane    Imaging    Lumbar x-ray demonstrates Further compression and anterior wedging of the T12  vertebral body now with loss of approximately 60% anterior vertebral  body height. This has progressed since prior CT 10/13/2021, although  the T12 vertebral body fracture was present at that time.    Assessment    T12 compression fracture  L2 vertebral body abnormality      Plan    I would recommend the patient repeat his lumbar MRI with and without contrast to evaluate the L2 vertebral body to rule out any concerning signs of neoplasm.  This will also give us further information on the continued healing or lack thereof of the T12 compression fracture.  I will follow-up with him with those results when they are available.  I also put in an order for him to obtain a TLSO  off-the-shelf brace.      Total time of 30 minutes spent with the patient today in counseling and coordination of care.

## 2021-11-18 NOTE — NURSING NOTE
"North Marshall is a 66 year old male who presents for:  Chief Complaint   Patient presents with     Neurologic Problem     Thoracic: Xray Prior        Initial Vitals:  BP 93/61   Pulse 60   Ht 5' 11\" (1.803 m)   Wt 210 lb (95.3 kg)   SpO2 98%   BMI 29.29 kg/m   Estimated body mass index is 29.29 kg/m  as calculated from the following:    Height as of this encounter: 5' 11\" (1.803 m).    Weight as of this encounter: 210 lb (95.3 kg).. Body surface area is 2.18 meters squared. BP completed using cuff size: large  Moderate Pain (5)    Nursing Comments:     Alejandro Eddy    "

## 2021-11-19 ENCOUNTER — TELEPHONE (OUTPATIENT)
Dept: NEUROSURGERY | Facility: CLINIC | Age: 66
End: 2021-11-19
Payer: COMMERCIAL

## 2021-11-19 NOTE — TELEPHONE ENCOUNTER
Called from radiology regarding urgent finding as specified below    Updated Bryce Bryan PA-C who saw patient yesterday     THORACIC LUMBAR STANDING TWO VIEWS 11/18/2021 2:32 PM      HISTORY: Compression fracture of T12 vertebra     COMPARISON: Lumbar spine MR and CT 10/13/2021.                                                                       IMPRESSION:  Further compression and anterior wedging of the T12  vertebral body now with loss of approximately 60% anterior vertebral  body height. This has progressed since prior CT 10/13/2021, although  the T12 vertebral body fracture was present at that time.     No obvious new loss of the L1 or L2 vertebral body height.     [Access Center: Further compression and anterior wedging of previously  fractured T12 vertebral body.]     This report will be copied to the [facility] to ensure a  provider acknowledges the finding. Access Center is available Monday  through Friday 8:00 am-3:30 pm.      ANGELA COLE MD

## 2021-12-04 ENCOUNTER — HOSPITAL ENCOUNTER (OUTPATIENT)
Dept: MRI IMAGING | Facility: CLINIC | Age: 66
Discharge: HOME | End: 2021-12-04
Attending: PHYSICIAN ASSISTANT | Admitting: PHYSICIAN ASSISTANT
Payer: COMMERCIAL

## 2021-12-04 DIAGNOSIS — S22.080A COMPRESSION FRACTURE OF T12 VERTEBRA, INITIAL ENCOUNTER (H): ICD-10-CM

## 2021-12-04 PROCEDURE — A9585 GADOBUTROL INJECTION: HCPCS | Performed by: PHYSICIAN ASSISTANT

## 2021-12-04 PROCEDURE — 255N000002 HC RX 255 OP 636: Performed by: PHYSICIAN ASSISTANT

## 2021-12-04 PROCEDURE — 72158 MRI LUMBAR SPINE W/O & W/DYE: CPT

## 2021-12-04 RX ORDER — GADOBUTROL 604.72 MG/ML
9 INJECTION INTRAVENOUS ONCE
Status: COMPLETED | OUTPATIENT
Start: 2021-12-04 | End: 2021-12-04

## 2021-12-04 RX ADMIN — GADOBUTROL 9 ML: 604.72 INJECTION INTRAVENOUS at 18:35

## 2021-12-06 ENCOUNTER — TELEPHONE (OUTPATIENT)
Dept: NEUROSURGERY | Facility: CLINIC | Age: 66
End: 2021-12-06
Payer: COMMERCIAL

## 2021-12-06 NOTE — TELEPHONE ENCOUNTER
Patient called wanting results from Saturday imaging.  Needs to know as insurance is changing tomorrow.

## 2021-12-07 ENCOUNTER — TELEPHONE (OUTPATIENT)
Dept: NEUROSURGERY | Facility: CLINIC | Age: 66
End: 2021-12-07
Payer: COMMERCIAL

## 2021-12-07 DIAGNOSIS — S22.080A COMPRESSION FRACTURE OF T12 VERTEBRA, INITIAL ENCOUNTER (H): Primary | ICD-10-CM

## 2021-12-07 RX ORDER — DIAZEPAM 5 MG
5-10 TABLET ORAL SEE ADMIN INSTRUCTIONS
Qty: 2 TABLET | Refills: 0 | Status: SHIPPED | OUTPATIENT
Start: 2021-12-07

## 2021-12-07 NOTE — TELEPHONE ENCOUNTER
Message routed to Care team to review Lumbar MRI completed on 12/4/21 and contact patient since Bryce Bryan PA-C is out of office.   
Patient called looking for results of MRI that was done on 12-4-21.  Patient very anxious for results.   
Pt called back to see if he can get a call back to go over his MRI results.  
Reason for call: Pt called to request his MRI results. If someone can call him since Bryce is out this week that would be great. Thank you!   
Spoke with Providers. They will reach out to patient soon to discuss results and recommendations.     Called patient and communicated above. He understands and is agreeable.   
No indicators present

## 2021-12-07 NOTE — TELEPHONE ENCOUNTER
Reviewed MRI myself as well as with Dr. Antunez   Called patient and reviewed recommendations with patient, North and his wife     Recommendations include the following:   -Oncology consultation for further work up   -I will message PCP regarding DEXA scan and osteoporosis treatment   -Brace when upright   -Short interval follow up with our team- recommend follow up in 6-8 weeks with thoracic and lumbar MRI for short interval imaging follow up   -Dr. Antunez recommended this work up be complete and when we see him in 6-8 weeks if work up completed and still ongoing pain and/or worsening of imaging, we will review next steps    -Patient in agreement with plans. Dr. Antunez in agreement with plans   -I have placed Oncology referral   -I have called PCP Dr. Villalobos office and updated of plans and recommended work up with DEXA scan  -MRI orders have been placed. Valium order will be placed due to claustrophobia  -I will have our office reach out to schedule MRIs, follow up appointment and send valium Rx    Updated patient to call with questions, concerns   Patient in agreement

## 2021-12-07 NOTE — TELEPHONE ENCOUNTER
As per Harper Mclaughlin:  Update on patient- see below- Thanks      Reviewed MRI myself as well as with Dr. Antunez   Called patient and reviewed recommendations with patient, North and his wife     Recommendations include the following:   -Oncology consultation for further work up   -I will message PCP regarding DEXA scan and osteoporosis treatment   -Brace when upright   -Short interval follow up with our team- recommend follow up in 6-8 weeks with thoracic and lumbar MRI for short interval imaging follow up   -Dr. Antunez recommended this work up be complete and when we see him in 6-8 weeks if work up completed and still ongoing pain and/or worsening of imaging, we will review next steps    -Patient in agreement with plans. Dr. Antunez in agreement with plans   -I have placed Oncology referral   -I have called PCP Dr. Villalobos office and updated of plans and recommended work up with DEXA scan  -MRI orders have been placed. Valium order will be placed due to claustrophobia  -I will have our office reach out to schedule MRIs, follow up appointment and send valium Rx    Updated patient to call with questions, concerns   Patient in agreement     Imaging scheduled for 1/19 and followup with Dr Antunez scheduled for 1/21

## 2021-12-08 ENCOUNTER — TELEPHONE (OUTPATIENT)
Dept: NEUROSURGERY | Facility: CLINIC | Age: 66
End: 2021-12-08
Payer: COMMERCIAL

## 2021-12-08 ENCOUNTER — PATIENT OUTREACH (OUTPATIENT)
Dept: ONCOLOGY | Facility: CLINIC | Age: 66
End: 2021-12-08
Payer: COMMERCIAL

## 2021-12-08 DIAGNOSIS — M89.9 LYTIC BONE LESIONS ON XRAY: Primary | ICD-10-CM

## 2021-12-08 DIAGNOSIS — C41.2 MALIGNANT NEOPLASM OF VERTEBRAL COLUMN (H): ICD-10-CM

## 2021-12-08 NOTE — PROGRESS NOTES
Pt had a traumatic T12  fracture on Oct 2021 due to fall from a ladder. MR lumbar spine 12/4 showed worsening compression and patchy bone marrow signal T11-L2 consistent with myeloma or metastatic disease. Ordered PET and labs to determine if pt needs to see malignant heme or solid onc.     Cortney Reyes MD PhD

## 2021-12-08 NOTE — PROGRESS NOTES
New Patient Hematology / Oncology Nurse Navigator Note     Referral Date: December 7, 2021     Referring provider: Dr Guilherme Antunez /    Harper Mclaughlin PA-C [facility]      Referred to: Medical Oncology    Evaluation for : Compression fracture of T12 vertebra, r/o myeloma or other process     Clinical History (per Nurse review of records provided):       CT, MRIs -- BOOKMARKED      NS clinic note 11/18/21 -- BOOKMARKED    Clinical Assessment / Barriers to Care (Per Nurse):  Pt lives in [place], [place]      Records Location: [facility]     Referral updates and Plan:   December 8, 2021 OUTGOING CALL to pt , and his wife who was on speakerphone for our call:  Introduced my role as nurse navigator with [facility] Hematology/Oncology dept and that we have recd the referral to oncology from Dr Antunez and team.  Pt confirms they are aware of the referral and ready to schedule, prefers [facility] location  Explained the dept MD reviewed, recommended labs and PET, orders are in and they will go for labs tomorrow, PET will be scheduled next week to allow auth team to secure ins auth  Dr Reyes will review lab results and make recommendations next re: what type of oncologist he should see if one is needed. Questions answered to pt's/wife's stated satisfaction.  Explained to pt that they will receive a call from our scheduling intake team and provided our call-back number below if needed.  - message to PET prior auth team to help review for auth    Future Appointments   Date Time Provider Department Center   12/10/2021 11:30 AM CS LAB [facility] CS   12/20/2021 10:45 AM SHPETM1 SHPET [place] JOSE ANTONIO   1/19/2022  9:30 AM SHMRP1 SHMR2 [place] JOSE ANTONIO   1/19/2022 10:30 AM SHMRP1 SHMR2 [place] JOSE ANTONIO   1/21/2022  9:40 AM Guilherme Antunez MD [facility]     December 10, 2021 recd confirmation from PET auth team that PA is in process.   December 16, 2021 recd confirmation that PET auth has  been secured.   Recd msg from Dr Reyes that myeloma labs are all normal and we should proceeed with onc consult after PET to review and make recs for next steps, may/may not need bx based on PET findings. Pt notified, verbalized understanding and indicates he strongly prefers our [place] location, knows to expect a call from the scheduling team to secure the consult appt.     Danielle Lopez, RN, BSN, OCN  Hematology/Oncology Nurse Navigator   [facility] [place]  [phone]

## 2021-12-10 ENCOUNTER — LAB (OUTPATIENT)
Dept: LAB | Facility: CLINIC | Age: 66
End: 2021-12-10
Payer: COMMERCIAL

## 2021-12-10 DIAGNOSIS — M89.9 LYTIC BONE LESIONS ON XRAY: ICD-10-CM

## 2021-12-10 LAB
BASOPHILS # BLD AUTO: 0 10E3/UL (ref 0–0.2)
BASOPHILS NFR BLD AUTO: 1 %
EOSINOPHIL # BLD AUTO: 0.1 10E3/UL (ref 0–0.7)
EOSINOPHIL NFR BLD AUTO: 2 %
ERYTHROCYTE [DISTWIDTH] IN BLOOD BY AUTOMATED COUNT: 13.1 % (ref 10–15)
HCT VFR BLD AUTO: 46.6 % (ref 40–53)
HGB BLD-MCNC: 16.2 G/DL (ref 13.3–17.7)
LYMPHOCYTES # BLD AUTO: 2.6 10E3/UL (ref 0.8–5.3)
LYMPHOCYTES NFR BLD AUTO: 42 %
MCH RBC QN AUTO: 33.1 PG (ref 26.5–33)
MCHC RBC AUTO-ENTMCNC: 34.8 G/DL (ref 31.5–36.5)
MCV RBC AUTO: 95 FL (ref 78–100)
MONOCYTES # BLD AUTO: 0.5 10E3/UL (ref 0–1.3)
MONOCYTES NFR BLD AUTO: 9 %
NEUTROPHILS # BLD AUTO: 2.9 10E3/UL (ref 1.6–8.3)
NEUTROPHILS NFR BLD AUTO: 47 %
PLATELET # BLD AUTO: 204 10E3/UL (ref 150–450)
RBC # BLD AUTO: 4.9 10E6/UL (ref 4.4–5.9)
TOTAL PROTEIN SERUM FOR ELP: 6.8 G/DL (ref 6.8–8.8)
WBC # BLD AUTO: 6.2 10E3/UL (ref 4–11)

## 2021-12-10 PROCEDURE — 83883 ASSAY NEPHELOMETRY NOT SPEC: CPT

## 2021-12-10 PROCEDURE — 36415 COLL VENOUS BLD VENIPUNCTURE: CPT

## 2021-12-10 PROCEDURE — 85025 COMPLETE CBC W/AUTO DIFF WBC: CPT

## 2021-12-10 PROCEDURE — 86334 IMMUNOFIX E-PHORESIS SERUM: CPT | Performed by: PATHOLOGY

## 2021-12-10 PROCEDURE — 84155 ASSAY OF PROTEIN SERUM: CPT

## 2021-12-10 PROCEDURE — 82232 ASSAY OF BETA-2 PROTEIN: CPT

## 2021-12-10 PROCEDURE — 80053 COMPREHEN METABOLIC PANEL: CPT

## 2021-12-10 PROCEDURE — 83615 LACTATE (LD) (LDH) ENZYME: CPT

## 2021-12-10 PROCEDURE — 84165 PROTEIN E-PHORESIS SERUM: CPT | Performed by: PATHOLOGY

## 2021-12-11 LAB — LDH SERPL L TO P-CCNC: 134 U/L (ref 85–227)

## 2021-12-12 LAB
ALBUMIN SERPL-MCNC: 4 G/DL (ref 3.4–5)
ALP SERPL-CCNC: 106 U/L (ref 40–150)
ALT SERPL W P-5'-P-CCNC: 26 U/L (ref 0–70)
ANION GAP SERPL CALCULATED.3IONS-SCNC: 7 MMOL/L (ref 3–14)
AST SERPL W P-5'-P-CCNC: 16 U/L (ref 0–45)
BILIRUB SERPL-MCNC: 0.5 MG/DL (ref 0.2–1.3)
BUN SERPL-MCNC: 10 MG/DL (ref 7–30)
CALCIUM SERPL-MCNC: 9.3 MG/DL (ref 8.5–10.1)
CHLORIDE BLD-SCNC: 96 MMOL/L (ref 94–109)
CO2 SERPL-SCNC: 28 MMOL/L (ref 20–32)
CREAT SERPL-MCNC: 0.74 MG/DL (ref 0.66–1.25)
GFR SERPL CREATININE-BSD FRML MDRD: >90 ML/MIN/1.73M2
GLUCOSE BLD-MCNC: 99 MG/DL (ref 70–99)
POTASSIUM BLD-SCNC: 4 MMOL/L (ref 3.4–5.3)
PROT SERPL-MCNC: 7.5 G/DL (ref 6.8–8.8)
SODIUM SERPL-SCNC: 131 MMOL/L (ref 133–144)

## 2021-12-13 LAB
ALBUMIN SERPL ELPH-MCNC: 4.2 G/DL (ref 3.7–5.1)
ALPHA1 GLOB SERPL ELPH-MCNC: 0.3 G/DL (ref 0.2–0.4)
ALPHA2 GLOB SERPL ELPH-MCNC: 0.8 G/DL (ref 0.5–0.9)
B-GLOBULIN SERPL ELPH-MCNC: 0.7 G/DL (ref 0.6–1)
B2 MICROGLOB TUMOR MARKER SER-MCNC: 1.8 MG/L
GAMMA GLOB SERPL ELPH-MCNC: 0.7 G/DL (ref 0.7–1.6)
KAPPA LC FREE SER-MCNC: 1.37 MG/DL (ref 0.33–1.94)
KAPPA LC FREE/LAMBDA FREE SER NEPH: 1.11 {RATIO} (ref 0.26–1.65)
LAMBDA LC FREE SERPL-MCNC: 1.23 MG/DL (ref 0.57–2.63)
M PROTEIN SERPL ELPH-MCNC: 0 G/DL
PROT PATTERN SERPL ELPH-IMP: NORMAL
PROT PATTERN SERPL IFE-IMP: NORMAL

## 2021-12-16 NOTE — PROGRESS NOTES
RECORDS STATUS - ALL OTHER DIAGNOSIS      RECORDS RECEIVED FROM: Rd/ [facility]    DATE RECEIVED: 12/21/2021   NOTES STATUS DETAILS   OFFICE NOTE from referring provider  Dr Guilherme Antunez /     Harper Mclaughlin PA-C      OFFICE NOTE from medical oncologist N/A    DISCHARGE SUMMARY from hospital Complete 10/13/2021 - Compression fracture of T12 vertebra     DISCHARGE REPORT from the ER Complete 10/13/2021- Compression fracture of T12 vertebra     OPERATIVE REPORT N/A    MEDICATION LIST Complete [facility]   CLINICAL TRIAL TREATMENTS TO DATE     LABS     PATHOLOGY REPORTS NA    ANYTHING RELATED TO DIAGNOSIS Complete Labs last updated on 12/10/2021   GENONOMIC TESTING     TYPE:     IMAGING (NEED IMAGES & REPORT)     CT SCANS Complete- [facility]    Xray Thoracic Lumbar  COmplete 11/18/2021   Xray Chest Complete- [facility]     MRI Scheduled MRI Lumbar Spine 1/19/2022 (scheduled), 12/4/2021    MRI Thoracic Spine 1/19/2022, 10/13/2021   MAMMO     ULTRASOUND     PET Scheduled  12/20/2021     Action    Action Taken 12/16/2021 1:47PM [facility]     I called [facility] imaging dept  [phone]

## 2021-12-19 ENCOUNTER — HEALTH MAINTENANCE LETTER (OUTPATIENT)
Age: 66
End: 2021-12-19

## 2021-12-20 ENCOUNTER — HOSPITAL ENCOUNTER (OUTPATIENT)
Dept: PET IMAGING | Facility: CLINIC | Age: 66
Discharge: HOME | End: 2021-12-20
Attending: STUDENT IN AN ORGANIZED HEALTH CARE EDUCATION/TRAINING PROGRAM | Admitting: STUDENT IN AN ORGANIZED HEALTH CARE EDUCATION/TRAINING PROGRAM
Payer: COMMERCIAL

## 2021-12-20 DIAGNOSIS — C41.2 MALIGNANT NEOPLASM OF VERTEBRAL COLUMN (H): ICD-10-CM

## 2021-12-20 DIAGNOSIS — M89.9 LYTIC BONE LESIONS ON XRAY: ICD-10-CM

## 2021-12-20 PROCEDURE — 78815 PET IMAGE W/CT SKULL-THIGH: CPT | Mod: 26 | Performed by: STUDENT IN AN ORGANIZED HEALTH CARE EDUCATION/TRAINING PROGRAM

## 2021-12-20 PROCEDURE — 71260 CT THORAX DX C+: CPT | Mod: 26 | Performed by: STUDENT IN AN ORGANIZED HEALTH CARE EDUCATION/TRAINING PROGRAM

## 2021-12-20 PROCEDURE — 78815 PET IMAGE W/CT SKULL-THIGH: CPT | Mod: PI

## 2021-12-20 PROCEDURE — 343N000001 HC RX 343: Performed by: STUDENT IN AN ORGANIZED HEALTH CARE EDUCATION/TRAINING PROGRAM

## 2021-12-20 PROCEDURE — 250N000011 HC RX IP 250 OP 636: Performed by: STUDENT IN AN ORGANIZED HEALTH CARE EDUCATION/TRAINING PROGRAM

## 2021-12-20 PROCEDURE — 71260 CT THORAX DX C+: CPT | Mod: 59,PI

## 2021-12-20 PROCEDURE — 74177 CT ABD & PELVIS W/CONTRAST: CPT | Mod: 26 | Performed by: STUDENT IN AN ORGANIZED HEALTH CARE EDUCATION/TRAINING PROGRAM

## 2021-12-20 PROCEDURE — A9552 F18 FDG: HCPCS | Performed by: STUDENT IN AN ORGANIZED HEALTH CARE EDUCATION/TRAINING PROGRAM

## 2021-12-20 RX ORDER — IOPAMIDOL 755 MG/ML
10-135 INJECTION, SOLUTION INTRAVASCULAR ONCE
Status: COMPLETED | OUTPATIENT
Start: 2021-12-20 | End: 2021-12-20

## 2021-12-20 RX ADMIN — IOPAMIDOL 128 ML: 755 INJECTION, SOLUTION INTRAVENOUS at 11:06

## 2021-12-20 RX ADMIN — FLUDEOXYGLUCOSE F-18 11.4 MCI.: 500 INJECTION, SOLUTION INTRAVENOUS at 11:05

## 2021-12-21 ENCOUNTER — PATIENT OUTREACH (OUTPATIENT)
Dept: ONCOLOGY | Facility: CLINIC | Age: 66
End: 2021-12-21

## 2021-12-21 ENCOUNTER — PRE VISIT (OUTPATIENT)
Dept: ONCOLOGY | Facility: CLINIC | Age: 66
End: 2021-12-21

## 2021-12-21 ENCOUNTER — ONCOLOGY VISIT (OUTPATIENT)
Dept: ONCOLOGY | Facility: CLINIC | Age: 66
End: 2021-12-21
Attending: PHYSICIAN ASSISTANT
Payer: COMMERCIAL

## 2021-12-21 VITALS
TEMPERATURE: 97.8 F | RESPIRATION RATE: 18 BRPM | OXYGEN SATURATION: 97 % | HEART RATE: 61 BPM | DIASTOLIC BLOOD PRESSURE: 56 MMHG | WEIGHT: 211.5 LBS | BODY MASS INDEX: 30.28 KG/M2 | SYSTOLIC BLOOD PRESSURE: 108 MMHG | HEIGHT: 70 IN

## 2021-12-21 DIAGNOSIS — S22.080A COMPRESSION FRACTURE OF T12 VERTEBRA, INITIAL ENCOUNTER (H): ICD-10-CM

## 2021-12-21 DIAGNOSIS — R93.3 ABNORMAL FINDINGS ON DIAGNOSTIC IMAGING OF DIGESTIVE SYSTEM: Primary | ICD-10-CM

## 2021-12-21 PROCEDURE — 99205 OFFICE O/P NEW HI 60 MIN: CPT | Performed by: INTERNAL MEDICINE

## 2021-12-21 PROCEDURE — G0463 HOSPITAL OUTPT CLINIC VISIT: HCPCS

## 2021-12-21 RX ORDER — ACETAMINOPHEN AND CODEINE PHOSPHATE 300; 30 MG/1; MG/1
1 TABLET ORAL
COMMUNITY
Start: 2021-10-21

## 2021-12-21 ASSESSMENT — PAIN SCALES - GENERAL: PAINLEVEL: MODERATE PAIN (4)

## 2021-12-21 ASSESSMENT — MIFFLIN-ST. JEOR: SCORE: 1740.61

## 2021-12-21 NOTE — NURSING NOTE
"Oncology Rooming Note    December 21, 2021 10:49 AM   North Marshall is a 66 year old male who presents for:    Chief Complaint   Patient presents with     Oncology Clinic Visit     New Patient     Initial Vitals: /56   Pulse 61   Temp 97.8  F (36.6  C) (Tympanic)   Resp 18   Ht 1.778 m (5' 10\")   Wt 95.9 kg (211 lb 8 oz)   SpO2 97%   BMI 30.35 kg/m   Estimated body mass index is 30.35 kg/m  as calculated from the following:    Height as of this encounter: 1.778 m (5' 10\").    Weight as of this encounter: 95.9 kg (211 lb 8 oz). Body surface area is 2.18 meters squared.  Moderate Pain (4) Comment: Data Unavailable   No LMP for male patient.  Allergies reviewed: Yes  Medications reviewed: Yes    Medications: Medication refills not needed today.  Pharmacy name entered into "[employer]: [id number] [place], [place] - [address]    Clinical concerns:  New Patient      Isha Velez CMA              "

## 2021-12-21 NOTE — PROGRESS NOTES
Writer called [facility] in [place] to request pt's most recent colonoscopy report. Writer left a message with the [facility] department and requested they fax the report to [phone], or call us back at [phone] if they need any additional information.     Kelsy Baez, RN, BSN, ARSH  RN Care Coordinator  [facility]  [phone]

## 2021-12-21 NOTE — PROGRESS NOTES
[facility]    Hematology/Oncology New Patient Note      Today's Date: 12/21/21    Reason for Consult: abnormal marrow signal      HISTORY OF PRESENT ILLNESS: North Marshall is a 66 year old male who presents with abnormal marrow signal on imaging.   He had fallen off a ladder in October 2021 and was found to have a T12 compression fracture.  Repeat MRI lumbar spine on 12/4/21 showed a T12 vertebral body fracture progressively worse compression deformity compared to 10/13/21.  There is new retropulsion of the T12 vertebral body compression deformity results in moderate thecal sac stenosis.  There also notes patchy abnormal bone marrow signal and enhancement within the T11, L1, and L2 vertebral bodies, presumed related to evolving/resolving post-traumatic bone marrow edema.  A referral was made to oncology for further evaluation.  He had labs, which were negative for myeloma.  Therefore, he proceeded with PET scan and referred to see medical oncology.       Clay is a current smoker, and has smoked for about 50 years.  He notes heavy family history of cancer, including his father (lung cancer), mother (unknown, may have been breast cancer), grandmother.        REVIEW OF SYSTEMS:   14 point ROS was reviewed and is negative other than as noted above in HPI.       HOME MEDICATIONS:  Current Outpatient Medications   Medication Sig Dispense Refill     acetaminophen (TYLENOL) 500 MG tablet Take 500-1,000 mg by mouth every 6 hours as needed for mild pain       acetaminophen-codeine (TYLENOL W/CODEINE #3) 300-30 MG per tablet Take 1 tablet by mouth       albuterol (PROAIR HFA/PROVENTIL HFA/VENTOLIN HFA) 108 (90 Base) MCG/ACT inhaler Inhale 1-2 puffs into the lungs every 6 hours as needed for shortness of breath / dyspnea or wheezing       aspirin (ECOTRIN LOW STRENGTH) 81 MG EC tablet Take 1 tablet (81 mg) by mouth daily  0     diazepam (VALIUM) 5 MG tablet Take 1-2 tablets (5-10 mg) by mouth See  Admin Instructions Take 1 tablet 30 minutes prior to MRI. Take another tablet as needed at time of procedure. 2 tablet 0     magnesium oxide (MAG-OX) 400 MG tablet Take 400 mg by mouth daily       naproxen sodium (ANAPROX) 220 MG tablet Take 1-2 tablets (220-440 mg) by mouth 2 times daily as needed for moderate pain  0     rosuvastatin (CRESTOR) 40 MG tablet Take 40 mg by mouth daily       valsartan-hydrochlorothiazide (DIOVAN HCT) 160-12.5 MG tablet Take 1 tablet by mouth daily       Vitamin D, Cholecalciferol, 25 MCG (1000 UT) TABS Take 1 tablet by mouth daily       cyclobenzaprine (FLEXERIL) 10 MG tablet Take 1 tablet (10 mg) by mouth 3 times daily as needed for muscle spasms 15 tablet 0     senna-docusate (SENOKOT-S/PERICOLACE) 8.6-50 MG tablet Take 1 tablet by mouth 2 times daily as needed for constipation  0         ALLERGIES:  Allergies   Allergen Reactions     Flu Virus Vaccine Difficulty breathing and Swelling         PAST MEDICAL HISTORY:  No past medical history on file.      PAST SURGICAL HISTORY:  No past surgical history on file.      SOCIAL HISTORY:  Social History     Socioeconomic History     Marital status:      Spouse name: Not on file     Number of children: Not on file     Years of education: Not on file     Highest education level: Not on file   Occupational History     Not on file   Tobacco Use     Smoking status: Current Every Day Smoker     Smokeless tobacco: Never Used   Substance and Sexual Activity     Alcohol use: Not Currently     Drug use: Never     Sexual activity: Not on file   Other Topics Concern     Not on file   Social History Narrative     Not on file     Social Determinants of Health     Financial Resource Strain: Not on file   Food Insecurity: Not on file   Transportation Needs: Not on file   Physical Activity: Not on file   Stress: Not on file   Social Connections: Not on file   Intimate Partner Violence: Not on file   Housing Stability: Not on file         FAMILY  "HISTORY:  See HPI.      PHYSICAL EXAM:  Vital signs:  /56   Pulse 61   Temp 97.8  F (36.6  C) (Tympanic)   Resp 18   Ht 1.778 m (5' 10\")   Wt 95.9 kg (211 lb 8 oz)   SpO2 97%   BMI 30.35 kg/m     ECO  GENERAL/CONSTITUTIONAL: No acute distress. Accompanied by wife.  EYES: No scleral icterus.  NEUROLOGIC: Alert, oriented, answers questions appropriately.  INTEGUMENTARY: No jaundice.  GAIT: in wheelchair.      LABS:  CBC RESULTS:   Recent Labs   Lab Test 12/10/21  1134   WBC 6.2   RBC 4.90   HGB 16.2   HCT 46.6   MCV 95   MCH 33.1*   MCHC 34.8   RDW 13.1          Recent Labs   Lab Test 12/10/21  1134 10/31/21  1123   * 130*   POTASSIUM 4.0 3.8   CHLORIDE 96 101   CO2 28 22   ANIONGAP 7 7   GLC 99 84   BUN 10 10   CR 0.74 0.72   LIZBETH 9.3 8.9     Lab Results   Component Value Date    AST 16 12/10/2021    AST 19 2017     Lab Results   Component Value Date    ALT 26 12/10/2021    ALT 31 2017     No results found for: BILICONJ   Lab Results   Component Value Date    BILITOTAL 0.5 12/10/2021    BILITOTAL 0.5 2017     Lab Results   Component Value Date    ALBUMIN 4.0 12/10/2021    ALBUMIN 3.6 2017     Lab Results   Component Value Date    PROTTOTAL 7.5 12/10/2021    PROTTOTAL 6.8 2017      Lab Results   Component Value Date    ALKPHOS 106 12/10/2021    ALKPHOS 83 2017     Component      Latest Ref Rng & Units 12/10/2021   Albumin Fraction      3.7 - 5.1 g/dL 4.2   Alpha 1 Fraction      0.2 - 0.4 g/dL 0.3   Alpha 2 Fraction      0.5 - 0.9 g/dL 0.8   Beta Fraction      0.6 - 1.0 g/dL 0.7   Gamma Fraction      0.7 - 1.6 g/dL 0.7   Monoclonal Peak      <=0.0 g/dL 0.0   ELP Interpretation:       Essentially normal electrophoretic pattern. No obvious monoclonal proteins seen. Pathologic significance requires clinical correlation. RICHARD Dasilva M.D., Ph.D., Pathologist ().   Jane Lew Free Light Chains      0.33 - 1.94 mg/dL 1.37   LAMBDA FREE LT CHAINS      " 0.57 - 2.63 mg/dL 1.23   KAPPA/LAMBDA RATIO      0.26 - 1.65 1.11   Immunofixation ELP       No monoclonal protein seen on immunofixation. Pathological significance requires clinical correlation. RICHARD Dasilva M.D., Ph.D., Pathologist ([phone])   Total Protein Serum for ELP      6.8 - 8.8 g/dL 6.8       IMAGING:  PET 12/20/21:  IMPRESSION: In this patient with spinal degenerative changes and  compression deformity with abnormal bone marrow signal:     1. No suspicious hypermetabolism to the T12, L1, or L2 vertebral  bodies. Please note that this does not fully exclude underlying  malignancy as some malignancies (e.g. multiple myeloma) can be nonavid  and active. Recommend continued following with MRI to monitor the  signal abnormalities described on 12/5/2021.     2. Unchanged severe compression deformity of the T12 vertebral body  with posterior retropulsed fragment resulting in moderate spinal canal  narrowing. Linear hypermetabolism along the compression deformity  defect is most likely posttraumatic, with no nodular hypermetabolism  to indicate underlying lesion.     3. Patchy sclerosis and endplate irregularities in L2 without  suspicious FDG uptake, suggesting combination of  degenerative changes  and Schmorl's node deformities.     4. Focal uptake to the anorectal region, nonspecific, most commonly  secondary to inflammation such as inflamed hemorrhoids, although  degree of uptake is slightly higher than typically seen with  inflammation and this could be malignant lesion. Consider direct  Visualization.      ASSESSMENT/PLAN:  North Marshall is a 66 year old male with:      1) Abnormal marrow signal: Patient presented to neurosurgery after a fall and T12 compression fracture.  MRI lumbar spine noted abnormal marrow signal, that could be post-traumatic, but could not rule out underlying malignancy, such as myeloma or metastatic disease.  Labs were not consistent with multiple myeloma, including  normal kidney function, calcium, hemoglobin, SPEP, serum free light chains.  We reviewed the PET scan images together, as well as the report.  PET scan showed no suspicious hypermetabolism to the T12, L1 or L2 vertebral bodies.  There is no nodular hypermetabolism to indicate underlying lesion.      There is focal uptake to the anorectal region, nonspecific, most commonly secondary to inflammation, such as inflamed hemorrhoids, although degree of uptake is slightly higher than typically seen with inflammation and could be malignant lesion.    So far there is not clear area of malignancy, although we should investigate this anorectal region.  He does note that he has hemorrhoids.  He says that he has history of polyps, so he last got a colonoscopy about 2 years ago.  We don't have these records in the system.  I discussed doing another colonoscopy to make sure there is not malignant lesion in this area, and he is agreeable.      -referral to colonoscopy  -he has repeat MRI spine on 1/19/22 and will continue to monitor the marrow signal abnormalities   -RTC in late January 2022    2) Compression fracture:  -he has follow-up with neurosurgery in January 2022    3) Smoking:  -strongly advised smoking cessation      Blanca Funk MD  Hematology/Oncology  [facility]    Total time spent on day of visit, including review of tests, obtaining/reviewing separately obtained history, ordering medications/tests/procedures, communicating with PCP/consultants, and documenting in electronic medical record: 60 minutes

## 2021-12-21 NOTE — LETTER
12/21/2021         RE: North Marshall  [address] [address]        Dear Colleague,    Thank you for referring your patient, North Marshall, to the [facility]. Please see a copy of my visit note below.    [facility]    Hematology/Oncology New Patient Note      Today's Date: 12/21/21    Reason for Consult: abnormal marrow signal      HISTORY OF PRESENT ILLNESS: North Marshall is a 66 year old male who presents with abnormal marrow signal on imaging.   He had fallen off a ladder in October 2021 and was found to have a T12 compression fracture.  Repeat MRI lumbar spine on 12/4/21 showed a T12 vertebral body fracture progressively worse compression deformity compared to 10/13/21.  There is new retropulsion of the T12 vertebral body compression deformity results in moderate thecal sac stenosis.  There also notes patchy abnormal bone marrow signal and enhancement within the T11, L1, and L2 vertebral bodies, presumed related to evolving/resolving post-traumatic bone marrow edema.  A referral was made to oncology for further evaluation.  He had labs, which were negative for myeloma.  Therefore, he proceeded with PET scan and referred to see medical oncology.       Clay is a current smoker, and has smoked for about 50 years.  He notes heavy family history of cancer, including his father (lung cancer), mother (unknown, may have been breast cancer), grandmother.        REVIEW OF SYSTEMS:   14 point ROS was reviewed and is negative other than as noted above in HPI.       HOME MEDICATIONS:  Current Outpatient Medications   Medication Sig Dispense Refill     acetaminophen (TYLENOL) 500 MG tablet Take 500-1,000 mg by mouth every 6 hours as needed for mild pain       acetaminophen-codeine (TYLENOL W/CODEINE #3) 300-30 MG per tablet Take 1 tablet by mouth       albuterol (PROAIR HFA/PROVENTIL HFA/VENTOLIN HFA) 108 (90 Base) MCG/ACT  inhaler Inhale 1-2 puffs into the lungs every 6 hours as needed for shortness of breath / dyspnea or wheezing       aspirin (ECOTRIN LOW STRENGTH) 81 MG EC tablet Take 1 tablet (81 mg) by mouth daily  0     diazepam (VALIUM) 5 MG tablet Take 1-2 tablets (5-10 mg) by mouth See Admin Instructions Take 1 tablet 30 minutes prior to MRI. Take another tablet as needed at time of procedure. 2 tablet 0     magnesium oxide (MAG-OX) 400 MG tablet Take 400 mg by mouth daily       naproxen sodium (ANAPROX) 220 MG tablet Take 1-2 tablets (220-440 mg) by mouth 2 times daily as needed for moderate pain  0     rosuvastatin (CRESTOR) 40 MG tablet Take 40 mg by mouth daily       valsartan-hydrochlorothiazide (DIOVAN HCT) 160-12.5 MG tablet Take 1 tablet by mouth daily       Vitamin D, Cholecalciferol, 25 MCG (1000 UT) TABS Take 1 tablet by mouth daily       cyclobenzaprine (FLEXERIL) 10 MG tablet Take 1 tablet (10 mg) by mouth 3 times daily as needed for muscle spasms 15 tablet 0     senna-docusate (SENOKOT-S/PERICOLACE) 8.6-50 MG tablet Take 1 tablet by mouth 2 times daily as needed for constipation  0         ALLERGIES:  Allergies   Allergen Reactions     Flu Virus Vaccine Difficulty breathing and Swelling         PAST MEDICAL HISTORY:  No past medical history on file.      PAST SURGICAL HISTORY:  No past surgical history on file.      SOCIAL HISTORY:  Social History     Socioeconomic History     Marital status:      Spouse name: Not on file     Number of children: Not on file     Years of education: Not on file     Highest education level: Not on file   Occupational History     Not on file   Tobacco Use     Smoking status: Current Every Day Smoker     Smokeless tobacco: Never Used   Substance and Sexual Activity     Alcohol use: Not Currently     Drug use: Never     Sexual activity: Not on file   Other Topics Concern     Not on file   Social History Narrative     Not on file     Social Determinants of Health     Financial  "Resource Strain: Not on file   Food Insecurity: Not on file   Transportation Needs: Not on file   Physical Activity: Not on file   Stress: Not on file   Social Connections: Not on file   Intimate Partner Violence: Not on file   Housing Stability: Not on file         FAMILY HISTORY:  See HPI.      PHYSICAL EXAM:  Vital signs:  /56   Pulse 61   Temp 97.8  F (36.6  C) (Tympanic)   Resp 18   Ht 1.778 m (5' 10\")   Wt 95.9 kg (211 lb 8 oz)   SpO2 97%   BMI 30.35 kg/m     ECO  GENERAL/CONSTITUTIONAL: No acute distress. Accompanied by wife.  EYES: No scleral icterus.  NEUROLOGIC: Alert, oriented, answers questions appropriately.  INTEGUMENTARY: No jaundice.  GAIT: in wheelchair.      LABS:  CBC RESULTS:   Recent Labs   Lab Test 12/10/21  1134   WBC 6.2   RBC 4.90   HGB 16.2   HCT 46.6   MCV 95   MCH 33.1*   MCHC 34.8   RDW 13.1          Recent Labs   Lab Test 12/10/21  1134 10/31/21  1123   * 130*   POTASSIUM 4.0 3.8   CHLORIDE 96 101   CO2 28 22   ANIONGAP 7 7   GLC 99 84   BUN 10 10   CR 0.74 0.72   LIZBETH 9.3 8.9     Lab Results   Component Value Date    AST 16 12/10/2021    AST 19 2017     Lab Results   Component Value Date    ALT 26 12/10/2021    ALT 31 2017     No results found for: BILICONJ   Lab Results   Component Value Date    BILITOTAL 0.5 12/10/2021    BILITOTAL 0.5 2017     Lab Results   Component Value Date    ALBUMIN 4.0 12/10/2021    ALBUMIN 3.6 2017     Lab Results   Component Value Date    PROTTOTAL 7.5 12/10/2021    PROTTOTAL 6.8 2017      Lab Results   Component Value Date    ALKPHOS 106 12/10/2021    ALKPHOS 83 2017     Component      Latest Ref Rng & Units 12/10/2021   Albumin Fraction      3.7 - 5.1 g/dL 4.2   Alpha 1 Fraction      0.2 - 0.4 g/dL 0.3   Alpha 2 Fraction      0.5 - 0.9 g/dL 0.8   Beta Fraction      0.6 - 1.0 g/dL 0.7   Gamma Fraction      0.7 - 1.6 g/dL 0.7   Monoclonal Peak      <=0.0 g/dL 0.0   ELP Interpretation:       " Essentially normal electrophoretic pattern. No obvious monoclonal proteins seen. Pathologic significance requires clinical correlation. RICHARD Dasilva M.D., Ph.D., Pathologist ().   Wever Free Light Chains      0.33 - 1.94 mg/dL 1.37   LAMBDA FREE LT CHAINS      0.57 - 2.63 mg/dL 1.23   KAPPA/LAMBDA RATIO      0.26 - 1.65 1.11   Immunofixation ELP       No monoclonal protein seen on immunofixation. Pathological significance requires clinical correlation. RICHARD Dasilva M.D., Ph.D., Pathologist ([phone])   Total Protein Serum for ELP      6.8 - 8.8 g/dL 6.8       IMAGING:  PET 12/20/21:  IMPRESSION: In this patient with spinal degenerative changes and  compression deformity with abnormal bone marrow signal:     1. No suspicious hypermetabolism to the T12, L1, or L2 vertebral  bodies. Please note that this does not fully exclude underlying  malignancy as some malignancies (e.g. multiple myeloma) can be nonavid  and active. Recommend continued following with MRI to monitor the  signal abnormalities described on 12/5/2021.     2. Unchanged severe compression deformity of the T12 vertebral body  with posterior retropulsed fragment resulting in moderate spinal canal  narrowing. Linear hypermetabolism along the compression deformity  defect is most likely posttraumatic, with no nodular hypermetabolism  to indicate underlying lesion.     3. Patchy sclerosis and endplate irregularities in L2 without  suspicious FDG uptake, suggesting combination of  degenerative changes  and Schmorl's node deformities.     4. Focal uptake to the anorectal region, nonspecific, most commonly  secondary to inflammation such as inflamed hemorrhoids, although  degree of uptake is slightly higher than typically seen with  inflammation and this could be malignant lesion. Consider direct  Visualization.      ASSESSMENT/PLAN:  North Marshall is a 66 year old male with:      1) Abnormal marrow signal: Patient presented to  neurosurgery after a fall and T12 compression fracture.  MRI lumbar spine noted abnormal marrow signal, that could be post-traumatic, but could not rule out underlying malignancy, such as myeloma or metastatic disease.  Labs were not consistent with multiple myeloma, including normal kidney function, calcium, hemoglobin, SPEP, serum free light chains.  We reviewed the PET scan images together, as well as the report.  PET scan showed no suspicious hypermetabolism to the T12, L1 or L2 vertebral bodies.  There is no nodular hypermetabolism to indicate underlying lesion.      There is focal uptake to the anorectal region, nonspecific, most commonly secondary to inflammation, such as inflamed hemorrhoids, although degree of uptake is slightly higher than typically seen with inflammation and could be malignant lesion.    So far there is not clear area of malignancy, although we should investigate this anorectal region.  He does note that he has hemorrhoids.  He says that he has history of polyps, so he last got a colonoscopy about 2 years ago.  We don't have these records in the system.  I discussed doing another colonoscopy to make sure there is not malignant lesion in this area, and he is agreeable.      -referral to colonoscopy  -he has repeat MRI spine on 1/19/22 and will continue to monitor the marrow signal abnormalities   -RTC in late January 2022    2) Compression fracture:  -he has follow-up with neurosurgery in January 2022    3) Smoking:  -strongly advised smoking cessation      Blanca Funk MD  Hematology/Oncology  [facility]    Total time spent on day of visit, including review of tests, obtaining/reviewing separately obtained history, ordering medications/tests/procedures, communicating with PCP/consultants, and documenting in electronic medical record: 60 minutes        Again, thank you for allowing me to participate in the care of your patient.        Sincerely,        Blanca  Ki Funk MD

## 2021-12-22 NOTE — PROGRESS NOTES
Incoming fax received with colonoscopy report from [facility] from exam date, 09/03/2019. Per report, [facility] recommended a repeat colonoscopy in 3 years.    Writer will update Dr. Funk and sent to scanning.     Kelsy Baez, RN, BSN, ARSH  RN Care Coordinator  [facility]  [phone]

## 2021-12-28 NOTE — PATIENT INSTRUCTIONS
Referral/Order for colonoscopy faxed to [facility] in [place] 12/28. They will contact patient to schedule the appointment.

## 2022-01-07 ENCOUNTER — TRANSFERRED RECORDS (OUTPATIENT)
Dept: HEALTH INFORMATION MANAGEMENT | Facility: CLINIC | Age: 67
End: 2022-01-07
Payer: COMMERCIAL

## 2022-01-19 ENCOUNTER — HOSPITAL ENCOUNTER (OUTPATIENT)
Dept: MRI IMAGING | Facility: CLINIC | Age: 67
End: 2022-01-19
Attending: PHYSICIAN ASSISTANT
Payer: COMMERCIAL

## 2022-01-19 DIAGNOSIS — S22.080A COMPRESSION FRACTURE OF T12 VERTEBRA, INITIAL ENCOUNTER (H): ICD-10-CM

## 2022-01-19 PROCEDURE — A9585 GADOBUTROL INJECTION: HCPCS | Performed by: PHYSICIAN ASSISTANT

## 2022-01-19 PROCEDURE — 72158 MRI LUMBAR SPINE W/O & W/DYE: CPT

## 2022-01-19 PROCEDURE — 72157 MRI CHEST SPINE W/O & W/DYE: CPT

## 2022-01-19 PROCEDURE — 255N000002 HC RX 255 OP 636: Performed by: PHYSICIAN ASSISTANT

## 2022-01-19 RX ORDER — GADOBUTROL 604.72 MG/ML
9 INJECTION INTRAVENOUS ONCE
Status: COMPLETED | OUTPATIENT
Start: 2022-01-19 | End: 2022-01-19

## 2022-01-19 RX ADMIN — GADOBUTROL 9 ML: 604.72 INJECTION INTRAVENOUS at 10:29

## 2022-01-21 ENCOUNTER — OFFICE VISIT (OUTPATIENT)
Dept: NEUROSURGERY | Facility: CLINIC | Age: 67
End: 2022-01-21
Payer: COMMERCIAL

## 2022-01-21 VITALS
HEIGHT: 70 IN | SYSTOLIC BLOOD PRESSURE: 112 MMHG | RESPIRATION RATE: 18 BRPM | WEIGHT: 211 LBS | DIASTOLIC BLOOD PRESSURE: 60 MMHG | BODY MASS INDEX: 30.21 KG/M2 | HEART RATE: 74 BPM

## 2022-01-21 DIAGNOSIS — S22.081D CLOSED STABLE BURST FRACTURE OF TWELFTH THORACIC VERTEBRA WITH ROUTINE HEALING, SUBSEQUENT ENCOUNTER: Primary | ICD-10-CM

## 2022-01-21 PROCEDURE — 99213 OFFICE O/P EST LOW 20 MIN: CPT | Performed by: NEUROLOGICAL SURGERY

## 2022-01-21 ASSESSMENT — MIFFLIN-ST. JEOR: SCORE: 1738.34

## 2022-01-21 NOTE — NURSING NOTE
"North Marshall is a 66 year old male who presents for:  Chief Complaint   Patient presents with     RECHECK     Compression fracture         Initial Vitals:  /60   Pulse 74   Resp 18   Ht 5' 10\" (1.778 m)   Wt 211 lb (95.7 kg)   BMI 30.28 kg/m   Estimated body mass index is 30.28 kg/m  as calculated from the following:    Height as of this encounter: 5' 10\" (1.778 m).    Weight as of this encounter: 211 lb (95.7 kg).. Body surface area is 2.17 meters squared. BP completed using cuff size: wrist cuff  Data Unavailable    Nursing Comments:     Danielle Long, [facility]    "

## 2022-01-21 NOTE — PATIENT INSTRUCTIONS
Patient Next Steps:      Order placed for physical therapy. You can call the phone number highlighted in the order to schedule your appointment. Please call our clinic if symptoms persist after your course of physical therapy.    Dr Antunez  would like to see you back in the clinic if you have further concerns    Please call us if you have any further questions or concerns.    [facility]   Phone: [phone]  Fax: [phone]

## 2022-01-21 NOTE — LETTER
"    1/21/2022         RE: North Marshall  [address] [address]        Dear Colleague,    Thank you for referring your patient, North Marshall, to the [facility] NEUROLOGY [facility]. Please see a copy of my visit note below.    It was a pleasure to see North Marshall today in Neurosurgery Clinic. He is a 66 year old male who has been previously seen in our clinic for his T12 fracture.  There is been some concern for possible malignancy related to the fracture, however he has had serial imaging as well as a PET scan which seem to have put this to rest.    The patient continues to have intermittent symptoms approximately the thoracolumbar junction radiating outwards.  He has some good days and some bad days when activity worsens his symptoms.    He has not done any physical therapy and he is not currently wearing a brace.    He also states he has had DEXA scan performed by his primary care provider which sounds like it did not show any osteoporosis.    Vitals:    01/21/22 0939   BP: 112/60   Pulse: 74   Resp: 18   Weight: 95.7 kg (211 lb)   Height: 1.778 m (5' 10\")     Estimated body mass index is 30.28 kg/m  as calculated from the following:    Height as of this encounter: 1.778 m (5' 10\").    Weight as of this encounter: 95.7 kg (211 lb).  Data Unavailable    Awake alert and oriented.  Mildly tender to palpation at thoracolumbar junction    Bilateral lower extremity strength 5 out of 5 in all muscle groups.    Imaging: MRI of the thoracolumbar spine from January 19 shows fairly stable alignment of the T12 fracture.  Over the last 3 months there appears to be some decrease in the STIR signal in L1 and L2.  Both to my interpretation and the radiologist most of the enhancement appears to be reactive in nature and there does not appear to be further concern for malignancy.  The imaging was reviewed with the patient shown to the patient in clinic today.    Assessment: " T12 burst fracture, routine healing.    Plan: At this point I have encouraged the patient to start some physical therapy to see if we can improve his activity and get him feeling better.  However if he continues to be symptomatic and not making progress with physical therapy I think seeing him back in 6 to 8 weeks to discuss possible vertebroplasty would be the next reasonable course of action.         Again, thank you for allowing me to participate in the care of your patient.        Sincerely,        Guilherme Antunez MD

## 2022-01-21 NOTE — PROGRESS NOTES
"It was a pleasure to see North Marshall today in Neurosurgery Clinic. He is a 66 year old male who has been previously seen in our clinic for his T12 fracture.  There is been some concern for possible malignancy related to the fracture, however he has had serial imaging as well as a PET scan which seem to have put this to rest.    The patient continues to have intermittent symptoms approximately the thoracolumbar junction radiating outwards.  He has some good days and some bad days when activity worsens his symptoms.    He has not done any physical therapy and he is not currently wearing a brace.    He also states he has had DEXA scan performed by his primary care provider which sounds like it did not show any osteoporosis.    Vitals:    01/21/22 0939   BP: 112/60   Pulse: 74   Resp: 18   Weight: 95.7 kg (211 lb)   Height: 1.778 m (5' 10\")     Estimated body mass index is 30.28 kg/m  as calculated from the following:    Height as of this encounter: 1.778 m (5' 10\").    Weight as of this encounter: 95.7 kg (211 lb).  Data Unavailable    Awake alert and oriented.  Mildly tender to palpation at thoracolumbar junction    Bilateral lower extremity strength 5 out of 5 in all muscle groups.    Imaging: MRI of the thoracolumbar spine from January 19 shows fairly stable alignment of the T12 fracture.  Over the last 3 months there appears to be some decrease in the STIR signal in L1 and L2.  Both to my interpretation and the radiologist most of the enhancement appears to be reactive in nature and there does not appear to be further concern for malignancy.  The imaging was reviewed with the patient shown to the patient in clinic today.    Assessment: T12 burst fracture, routine healing.    Plan: At this point I have encouraged the patient to start some physical therapy to see if we can improve his activity and get him feeling better.  However if he continues to be symptomatic and not making progress with physical therapy " I think seeing him back in 6 to 8 weeks to discuss possible vertebroplasty would be the next reasonable course of action.

## 2022-01-27 ENCOUNTER — THERAPY VISIT (OUTPATIENT)
Dept: PHYSICAL THERAPY | Facility: CLINIC | Age: 67
End: 2022-01-27
Attending: NEUROLOGICAL SURGERY
Payer: COMMERCIAL

## 2022-01-27 DIAGNOSIS — S22.081D CLOSED STABLE BURST FRACTURE OF TWELFTH THORACIC VERTEBRA WITH ROUTINE HEALING, SUBSEQUENT ENCOUNTER: Primary | ICD-10-CM

## 2022-01-27 PROCEDURE — 97161 PT EVAL LOW COMPLEX 20 MIN: CPT | Mod: GP | Performed by: PHYSICAL THERAPIST

## 2022-01-27 PROCEDURE — 97110 THERAPEUTIC EXERCISES: CPT | Mod: GP | Performed by: PHYSICAL THERAPIST

## 2022-01-27 NOTE — PROGRESS NOTES
[facility]    OUTPATIENT Physical Therapy ORTHOPEDIC EVALUATION  PLAN OF TREATMENT FOR OUTPATIENT REHABILITATION  (COMPLETE FOR INITIAL CLAIMS ONLY)  Patient's Last Name, First Name, M.I.  YOB: 1955  JoannaNorth  SPENCER    Provider s Name:  [facility]   Medical Record No.  [id number]   Start of Care Date:  01/27/22   Onset Date:   10/13/21   Type:     _X__PT   ___OT Medical Diagnosis:    Encounter Diagnosis   Name Primary?    Closed stable burst fracture of twelfth thoracic vertebra with routine healing, subsequent encounter         Treatment Diagnosis:  Thoracic pain        Goals:     01/27/22 0500   Body Part   Goals listed below are for thoracic pain   Goal #1   Goal #1 ambulation   Previous Functional Level No restrictions   Current Functional Level Minutes patient can walk   Performance Level 10 minutes   STG Target Performance Minutes patient will be able to walk   Performance Level 20 minutes   Rationale for safe community ambulation;to maintain proper body mechanics/posture while ambulating to avoid additional compensatory injury due to improper gait mechanics;to promote a healthy and active lifestyle   Due Date 03/13/22    LTG Target Performance Minutes patient will be able to  walk   Performance Level 30-60 minutes   Rationale for safe community ambulation;to promote a healthy and active lifestyle   Due Date 04/27/22       Therapy Frequency:  1x/week  Predicted Duration of Therapy Intervention:  10 weeks    ALEX CARRERO, PT                 I CERTIFY THE NEED FOR THESE SERVICES FURNISHED UNDER        THIS PLAN OF TREATMENT AND WHILE UNDER MY CARE     (Physician attestation of this document indicates review and certification of the therapy plan).                       Certification Date From:  01/27/22   Certification Date To:  04/27/22    Referring  Provider:  Guilherme Antunez    Initial Assessment        See Epic Evaluation SOC Date: 01/27/22

## 2022-01-27 NOTE — PROGRESS NOTES
Physical Therapy Initial Evaluation - Thoracic      Therapist Impression:  Patient presents with signs and symptoms consistent with thoracic pain secondary to fairly stable alignment of T12 fracture with myofascial restrictions and generalized muscle weakness and poor postural awareness. Patient demonstrates impairments including decreased bilateral hip flexibility and range of motion, with hip flexor and hamstring tightness causing a poor standing/supine posture, with generalized weakness since 10/13/21 thoracic fracture. Patient's functional limitations include sitting, standing or walking for longer periods of time, exercising, housework and bending/lifting/twisting without difficulty or fear.    Subjective:  North Marshall is a 66 year old male. HPI given by patient  Patient's chief complaints: back pain in the bones and muscles that increase when he stands for too long, he was stuck in the recliner for so long after the injury. He has been starting to do more things. He retired April 2020 and loves being at home doing nothing, also he feels like he was impacted by COVID. He cannot lay flat on his back without being propped up.   Condition occurred due to fell from ladder.  Date of Onset: 10/13/21  Location of symptoms is at lumbar spine and then it radiates out to the muscles too  Symptoms other than pain include: he gets the pain in the bones first and it radiates out to the muscles, dull achy  Pain dependence on time of day is: depends on activity, he does take Tylenol at night before bed.   Pain rating is: 3/10.    Symptoms are exacerbated by: walking for longer periods of time, too much of an activity, stairs.    Symptoms are relieved by:  Sitting down.    Progression of symptoms is that symptoms are:  Slowly getting better.    Imaging/Special tests include: MRI of the thoracolumbar spine from January 19 shows fairly stable alignment of the T12 fracture.     Previous treatments include: no PT, no  brace after the fracture in October 2021.   Patient reports that general health is: good.     Current occupation is: retired  Primary job tasks include: driving, lifting/carrying, prolonged sitting    Red flags include: none    Patient's expectations for therapy include: he has to see the doc again in 2 weeks    Pertinent medical history includes:  History of fractures, HBP, overweight, smoking.    Medical allergies includes:   Flu virus vaccine  Surgical history includes: Appy.  Current medications include: high blood pressure meds, pain meds, vitamins     THORACIC:    Posture: PPT in sitting, hamstring tightness and HF tightness    Neurological:    Motor Deficit, Sensory Deficit, Reflexes, Dural Signs: + SLUMP R>L, MMT 5/5    Thoracic/lumbar AROM: (Major, Moderate, Minimal or Nil loss)  Movement Loss Scott Mod Min Nil Pain   Flexion fingers to knees + pain with + gowers       Extension     Cues to add as HEP   Rotation L     Fearful to try but able with slight inc symptoms   Rotation R     Fearful to try but able with slight inc of symptoms   Other          Palpation: NT Today    Limited hip mobility IR>ER R>L    Assessment/Plan:    Patient is a 66 year old male with lumbar complaints.    Patient has the following significant findings with corresponding treatment plan.                Diagnosis 1:  Thoracic and lumbar spine pain  Pain -  electric stimulation, manual therapy and splint/taping/bracing/orthotics  Decreased ROM/flexibility - manual therapy and therapeutic exercise  Decreased joint mobility - manual therapy and therapeutic exercise  Decreased strength - therapeutic exercise and therapeutic activities    Therapy Evaluation Codes:   Cumulative Therapy Evaluation is: Low complexity.    Previous and current functional limitations:  (See Goal Flow Sheet for this information)    Short term and Long term goals: (See Goal Flow Sheet for this information)     Communication ability:  Patient appears to be able to  clearly communicate and understand verbal and written communication and follow directions correctly.  Treatment Explanation - The following has been discussed with the patient:   RX ordered/plan of care  Anticipated outcomes  Possible risks and side effects  This patient would benefit from PT intervention to resume normal activities.   Rehab potential is fair.    Frequency:  1 X week, once daily  Duration:  for 6 weeks tapering to 1 X a month over 4 weeks  Discharge Plan:  Achieve all LTG.  Independent in home treatment program.  Reach maximal therapeutic benefit.    Please refer to the daily flowsheet for treatment today, total treatment time and time spent performing 1:1 timed codes.

## 2022-01-28 ENCOUNTER — ONCOLOGY VISIT (OUTPATIENT)
Dept: ONCOLOGY | Facility: CLINIC | Age: 67
End: 2022-01-28
Attending: INTERNAL MEDICINE
Payer: COMMERCIAL

## 2022-01-28 VITALS
BODY MASS INDEX: 30.99 KG/M2 | HEART RATE: 64 BPM | OXYGEN SATURATION: 99 % | DIASTOLIC BLOOD PRESSURE: 77 MMHG | SYSTOLIC BLOOD PRESSURE: 133 MMHG | RESPIRATION RATE: 18 BRPM | WEIGHT: 216 LBS | TEMPERATURE: 97 F

## 2022-01-28 DIAGNOSIS — R93.3 ABNORMAL FINDINGS ON DIAGNOSTIC IMAGING OF DIGESTIVE SYSTEM: Primary | ICD-10-CM

## 2022-01-28 PROCEDURE — G0463 HOSPITAL OUTPT CLINIC VISIT: HCPCS

## 2022-01-28 PROCEDURE — 99213 OFFICE O/P EST LOW 20 MIN: CPT | Performed by: INTERNAL MEDICINE

## 2022-01-28 ASSESSMENT — PAIN SCALES - GENERAL: PAINLEVEL: MODERATE PAIN (4)

## 2022-01-28 NOTE — PROGRESS NOTES
"Oncology Rooming Note    January 28, 2022 1:12 PM   North Marshall is a 66 year old male who presents for:    Chief Complaint   Patient presents with     Oncology Clinic Visit     Epidural hematoma (H)     Initial Vitals: /77 (BP Location: Right arm, Patient Position: Sitting, Cuff Size: Adult Large)   Pulse 64   Temp 97  F (36.1  C) (Oral)   Resp 18   Wt 98 kg (216 lb)   SpO2 99%   BMI 30.99 kg/m   Estimated body mass index is 30.99 kg/m  as calculated from the following:    Height as of 1/21/22: 1.778 m (5' 10\").    Weight as of this encounter: 98 kg (216 lb). Body surface area is 2.2 meters squared.  Moderate Pain (4) Comment: Data Unavailable   No LMP for male patient.  Allergies reviewed: Yes  Medications reviewed: Yes    Medications: Medication refills not needed today.  Pharmacy name entered into [employer]: [id number] [place], [place] - [address]    Clinical concerns: no     Rachel Ch CMA              "

## 2022-01-28 NOTE — PROGRESS NOTES
[facility]    Hematology/Oncology Established Patient Note      Today's Date: 1/28/22    Reason for Follow-up: abnormal marrow signal      HISTORY OF PRESENT ILLNESS: [place] Marshall is a 66 year old male who presents with abnormal marrow signal on imaging.   He had fallen off a ladder in October 2021 and was found to have a T12 compression fracture.  Repeat MRI lumbar spine on 12/4/21 showed a T12 vertebral body fracture progressively worse compression deformity compared to 10/13/21.  There is new retropulsion of the T12 vertebral body compression deformity results in moderate thecal sac stenosis.  There also notes patchy abnormal bone marrow signal and enhancement within the T11, L1, and L2 vertebral bodies, presumed related to evolving/resolving post-traumatic bone marrow edema.  A referral was made to oncology for further evaluation.  He had labs, which were negative for myeloma.  Therefore, he proceeded with PET scan and referred to see medical oncology.       Clay is a current smoker, and has smoked for about 50 years.  He notes heavy family history of cancer, including his father (lung cancer), mother (unknown, may have been breast cancer), grandmother.        INTERIM HISTORY: Clay feels well.  He saw Dr. Antunez recently and started physical therapy.      REVIEW OF SYSTEMS:   14 point ROS was reviewed and is negative other than as noted above in HPI.       HOME MEDICATIONS:  Current Outpatient Medications   Medication Sig Dispense Refill     acetaminophen (TYLENOL) 500 MG tablet Take 500-1,000 mg by mouth every 6 hours as needed for mild pain       acetaminophen-codeine (TYLENOL W/CODEINE #3) 300-30 MG per tablet Take 1 tablet by mouth       aspirin (ECOTRIN LOW STRENGTH) 81 MG EC tablet Take 1 tablet (81 mg) by mouth daily  0     magnesium oxide (MAG-OX) 400 MG tablet Take 400 mg by mouth daily       rosuvastatin (CRESTOR) 40 MG tablet Take 40 mg by mouth daily        valsartan-hydrochlorothiazide (DIOVAN HCT) 160-12.5 MG tablet Take 1 tablet by mouth daily       Vitamin D, Cholecalciferol, 25 MCG (1000 UT) TABS Take 1 tablet by mouth daily       albuterol (PROAIR HFA/PROVENTIL HFA/VENTOLIN HFA) 108 (90 Base) MCG/ACT inhaler Inhale 1-2 puffs into the lungs every 6 hours as needed for shortness of breath / dyspnea or wheezing (Patient not taking: Reported on 1/28/2022)       diazepam (VALIUM) 5 MG tablet Take 1-2 tablets (5-10 mg) by mouth See Admin Instructions Take 1 tablet 30 minutes prior to MRI. Take another tablet as needed at time of procedure. (Patient not taking: Reported on 1/28/2022) 2 tablet 0     naproxen sodium (ANAPROX) 220 MG tablet Take 1-2 tablets (220-440 mg) by mouth 2 times daily as needed for moderate pain (Patient not taking: Reported on 1/28/2022)  0     senna-docusate (SENOKOT-S/PERICOLACE) 8.6-50 MG tablet Take 1 tablet by mouth 2 times daily as needed for constipation  0         ALLERGIES:  Allergies   Allergen Reactions     Flu Virus Vaccine Difficulty breathing and Swelling         PAST MEDICAL HISTORY:  No past medical history on file.      PAST SURGICAL HISTORY:  No past surgical history on file.      SOCIAL HISTORY:  Social History     Socioeconomic History     Marital status:      Spouse name: Not on file     Number of children: Not on file     Years of education: Not on file     Highest education level: Not on file   Occupational History     Not on file   Tobacco Use     Smoking status: Current Every Day Smoker     Smokeless tobacco: Never Used   Substance and Sexual Activity     Alcohol use: Not Currently     Drug use: Never     Sexual activity: Not on file   Other Topics Concern     Parent/sibling w/ CABG, MI or angioplasty before 65F 55M? Not Asked   Social History Narrative     Not on file     Social Determinants of Health     Financial Resource Strain: Not on file   Food Insecurity: Not on file   Transportation Needs: Not on file    Physical Activity: Not on file   Stress: Not on file   Social Connections: Not on file   Intimate Partner Violence: Not on file   Housing Stability: Not on file         FAMILY HISTORY:  See HPI.      PHYSICAL EXAM:  Vital signs:  /77 (BP Location: Right arm, Patient Position: Sitting, Cuff Size: Adult Large)   Pulse 64   Temp 97  F (36.1  C) (Oral)   Resp 18   Wt 98 kg (216 lb)   SpO2 99%   BMI 30.99 kg/m     ECO  GENERAL/CONSTITUTIONAL: No acute distress. Accompanied by wife.  EYES: No scleral icterus.  NEUROLOGIC: Alert, oriented, answers questions appropriately.  INTEGUMENTARY: No jaundice.      LABS:  CBC RESULTS:   Recent Labs   Lab Test 12/10/21  1134   WBC 6.2   RBC 4.90   HGB 16.2   HCT 46.6   MCV 95   MCH 33.1*   MCHC 34.8   RDW 13.1          Recent Labs   Lab Test 12/10/21  1134 10/31/21  1123   * 130*   POTASSIUM 4.0 3.8   CHLORIDE 96 101   CO2 28 22   ANIONGAP 7 7   GLC 99 84   BUN 10 10   CR 0.74 0.72   LIZBETH 9.3 8.9     Lab Results   Component Value Date    AST 16 12/10/2021    AST 19 2017     Lab Results   Component Value Date    ALT 26 12/10/2021    ALT 31 2017     No results found for: BILICONJ   Lab Results   Component Value Date    BILITOTAL 0.5 12/10/2021    BILITOTAL 0.5 2017     Lab Results   Component Value Date    ALBUMIN 4.0 12/10/2021    ALBUMIN 3.6 2017     Lab Results   Component Value Date    PROTTOTAL 7.5 12/10/2021    PROTTOTAL 6.8 2017      Lab Results   Component Value Date    ALKPHOS 106 12/10/2021    ALKPHOS 83 2017     Component      Latest Ref Rng & Units 12/10/2021   Albumin Fraction      3.7 - 5.1 g/dL 4.2   Alpha 1 Fraction      0.2 - 0.4 g/dL 0.3   Alpha 2 Fraction      0.5 - 0.9 g/dL 0.8   Beta Fraction      0.6 - 1.0 g/dL 0.7   Gamma Fraction      0.7 - 1.6 g/dL 0.7   Monoclonal Peak      <=0.0 g/dL 0.0   ELP Interpretation:       Essentially normal electrophoretic pattern. No obvious monoclonal proteins seen.  Pathologic significance requires clinical correlation. RICHARD Dasilva M.D., Ph.D., Pathologist ().   [place] Free Light Chains      0.33 - 1.94 mg/dL 1.37   LAMBDA FREE LT CHAINS      0.57 - 2.63 mg/dL 1.23   KAPPA/LAMBDA RATIO      0.26 - 1.65 1.11   Immunofixation ELP       No monoclonal protein seen on immunofixation. Pathological significance requires clinical correlation. RICHARD Dasilva M.D., Ph.D., Pathologist ([phone])   Total Protein Serum for ELP      6.8 - 8.8 g/dL 6.8       IMAGING:  PET 12/20/21:  IMPRESSION: In this patient with spinal degenerative changes and  compression deformity with abnormal bone marrow signal:     1. No suspicious hypermetabolism to the T12, L1, or L2 vertebral  bodies. Please note that this does not fully exclude underlying  malignancy as some malignancies (e.g. multiple myeloma) can be nonavid  and active. Recommend continued following with MRI to monitor the  signal abnormalities described on 12/5/2021.     2. Unchanged severe compression deformity of the T12 vertebral body  with posterior retropulsed fragment resulting in moderate spinal canal  narrowing. Linear hypermetabolism along the compression deformity  defect is most likely posttraumatic, with no nodular hypermetabolism  to indicate underlying lesion.     3. Patchy sclerosis and endplate irregularities in L2 without  suspicious FDG uptake, suggesting combination of  degenerative changes  and Schmorl's node deformities.     4. Focal uptake to the anorectal region, nonspecific, most commonly  secondary to inflammation such as inflamed hemorrhoids, although  degree of uptake is slightly higher than typically seen with  inflammation and this could be malignant lesion. Consider direct  Visualization.    MRI L-spine 1/19/22:  1. Redemonstrated T12 compression fracture with interval further  height loss, now severe. Retropulsion of the posterior superior  endplate fracture fragment along the ventral aspect of  spinal canal  contributes to moderate bordering on moderate to severe spinal canal  stenosis which is similar if not slightly progressed compared to the  prior exam. Continued close interval follow-up is recommended.  2. Unchanged appearance of the L2 compression deformity with mild  height loss.  3. Nonspecific marrow signal changes in the T12 vertebral body, as  before, probably reactive. It is difficult to entirely exclude  underlying marrow replacing lesion, but this is thought to be somewhat  less likely. Continued follow-up is suggested.  4. Patchy nonspecific signal changes in the L2 vertebral body appear  somewhat decreased in degree compared to previous exams, probably  representing decreasing reactive marrow edema in the setting of  subacute fracture. Short interval follow-up MRI exam is suggested. No  other concerning marrow signal abnormalities are identified.  5. Multilevel degenerative changes, as described.    MRI T-spine 1/19/22:  1. Interval further height loss of the T12 compression fracture, now  severe. Redemonstrated retropulsion of the posterior superior endplate  fracture fragment of T12 into the spinal canal resulting in at least  moderate bordering on moderate to severe spinal canal stenosis with  mild mass effect on the distal spinal cord. No spinal cord signal  change identified.  2. Diffuse abnormal marrow signal and enhancement involving the T12  vertebral body is likely reactive in nature. Underlying marrow  replacing lesion is difficult to entirely exclude. Consider follow-up.  3. Multilevel degenerative changes, as described.  4. Please see separate report from lumbar spine MRI of same day for  additional details.        ASSESSMENT/PLAN:  [place] Marshall is a 66 year old male with:      1) Abnormal marrow signal: Patient presented to neurosurgery after a fall and T12 compression fracture.  MRI lumbar spine noted abnormal marrow signal, that could be post-traumatic, but could not  rule out underlying malignancy, such as myeloma or metastatic disease.  Labs were not consistent with multiple myeloma, including normal kidney function, calcium, hemoglobin, SPEP, serum free light chains.  PET scan showed no suspicious hypermetabolism to the T12, L1 or L2 vertebral bodies.  There is no nodular hypermetabolism to indicate underlying lesion.      There is focal uptake to the anorectal region, nonspecific, most commonly secondary to inflammation, such as inflamed hemorrhoids, although degree of uptake is slightly higher than typically seen with inflammation and could be malignant lesion.  He did get colonoscopy on 1/7/22, which saw internal hemorrhoids.  2 polyps were removed (tubular adenoma).  Next colonoscopy was recommended for 5 years.      There has not been evidence of malignancy found on imaging or labs.   Repeat MRI's on 1/19/22 showed the non-specific marrow signal changes are probably reactive.      -no routine follow-up in oncology scheduled; can follow-up as needed.    2) Compression fracture:  -he is following with neurosurgery, Dr. Antunez    3) Smoking:  -strongly advised smoking cessation      Blanca Funk MD  Hematology/Oncology  [facility]    Total time spent on day of visit, including review of tests, obtaining/reviewing separately obtained history, ordering medications/tests/procedures, communicating with PCP/consultants, and documenting in electronic medical record: 20 minutes

## 2022-01-28 NOTE — LETTER
1/28/2022         RE: North Marshall  [id number] [facility] [place] [address]        Dear Colleague,    Thank you for referring your patient, North Marshall, to the [facility]. Please see a copy of my visit note below.    [facility]    Hematology/Oncology Established Patient Note      Today's Date: 1/28/22    Reason for Follow-up: abnormal marrow signal      HISTORY OF PRESENT ILLNESS: [place] Marshall is a 66 year old male who presents with abnormal marrow signal on imaging.   He had fallen off a ladder in October 2021 and was found to have a T12 compression fracture.  Repeat MRI lumbar spine on 12/4/21 showed a T12 vertebral body fracture progressively worse compression deformity compared to 10/13/21.  There is new retropulsion of the T12 vertebral body compression deformity results in moderate thecal sac stenosis.  There also notes patchy abnormal bone marrow signal and enhancement within the T11, L1, and L2 vertebral bodies, presumed related to evolving/resolving post-traumatic bone marrow edema.  A referral was made to oncology for further evaluation.  He had labs, which were negative for myeloma.  Therefore, he proceeded with PET scan and referred to see medical oncology.       Clay is a current smoker, and has smoked for about 50 years.  He notes heavy family history of cancer, including his father (lung cancer), mother (unknown, may have been breast cancer), grandmother.        INTERIM HISTORY: Clay feels well.  He saw Dr. Antunez recently and started physical therapy.      REVIEW OF SYSTEMS:   14 point ROS was reviewed and is negative other than as noted above in HPI.       HOME MEDICATIONS:  Current Outpatient Medications   Medication Sig Dispense Refill     acetaminophen (TYLENOL) 500 MG tablet Take 500-1,000 mg by mouth every 6 hours as needed for mild pain       acetaminophen-codeine (TYLENOL W/CODEINE #3) 300-30 MG per tablet  Take 1 tablet by mouth       aspirin (ECOTRIN LOW STRENGTH) 81 MG EC tablet Take 1 tablet (81 mg) by mouth daily  0     magnesium oxide (MAG-OX) 400 MG tablet Take 400 mg by mouth daily       rosuvastatin (CRESTOR) 40 MG tablet Take 40 mg by mouth daily       valsartan-hydrochlorothiazide (DIOVAN HCT) 160-12.5 MG tablet Take 1 tablet by mouth daily       Vitamin D, Cholecalciferol, 25 MCG (1000 UT) TABS Take 1 tablet by mouth daily       albuterol (PROAIR HFA/PROVENTIL HFA/VENTOLIN HFA) 108 (90 Base) MCG/ACT inhaler Inhale 1-2 puffs into the lungs every 6 hours as needed for shortness of breath / dyspnea or wheezing (Patient not taking: Reported on 1/28/2022)       diazepam (VALIUM) 5 MG tablet Take 1-2 tablets (5-10 mg) by mouth See Admin Instructions Take 1 tablet 30 minutes prior to MRI. Take another tablet as needed at time of procedure. (Patient not taking: Reported on 1/28/2022) 2 tablet 0     naproxen sodium (ANAPROX) 220 MG tablet Take 1-2 tablets (220-440 mg) by mouth 2 times daily as needed for moderate pain (Patient not taking: Reported on 1/28/2022)  0     senna-docusate (SENOKOT-S/PERICOLACE) 8.6-50 MG tablet Take 1 tablet by mouth 2 times daily as needed for constipation  0         ALLERGIES:  Allergies   Allergen Reactions     Flu Virus Vaccine Difficulty breathing and Swelling         PAST MEDICAL HISTORY:  No past medical history on file.      PAST SURGICAL HISTORY:  No past surgical history on file.      SOCIAL HISTORY:  Social History     Socioeconomic History     Marital status:      Spouse name: Not on file     Number of children: Not on file     Years of education: Not on file     Highest education level: Not on file   Occupational History     Not on file   Tobacco Use     Smoking status: Current Every Day Smoker     Smokeless tobacco: Never Used   Substance and Sexual Activity     Alcohol use: Not Currently     Drug use: Never     Sexual activity: Not on file   Other Topics Concern      Parent/sibling w/ CABG, MI or angioplasty before 65F 55M? Not Asked   Social History Narrative     Not on file     Social Determinants of Health     Financial Resource Strain: Not on file   Food Insecurity: Not on file   Transportation Needs: Not on file   Physical Activity: Not on file   Stress: Not on file   Social Connections: Not on file   Intimate Partner Violence: Not on file   Housing Stability: Not on file         FAMILY HISTORY:  See HPI.      PHYSICAL EXAM:  Vital signs:  /77 (BP Location: Right arm, Patient Position: Sitting, Cuff Size: Adult Large)   Pulse 64   Temp 97  F (36.1  C) (Oral)   Resp 18   Wt 98 kg (216 lb)   SpO2 99%   BMI 30.99 kg/m     ECO  GENERAL/CONSTITUTIONAL: No acute distress. Accompanied by wife.  EYES: No scleral icterus.  NEUROLOGIC: Alert, oriented, answers questions appropriately.  INTEGUMENTARY: No jaundice.      LABS:  CBC RESULTS:   Recent Labs   Lab Test 12/10/21  1134   WBC 6.2   RBC 4.90   HGB 16.2   HCT 46.6   MCV 95   MCH 33.1*   MCHC 34.8   RDW 13.1          Recent Labs   Lab Test 12/10/21  1134 10/31/21  1123   * 130*   POTASSIUM 4.0 3.8   CHLORIDE 96 101   CO2 28 22   ANIONGAP 7 7   GLC 99 84   BUN 10 10   CR 0.74 0.72   LIZBETH 9.3 8.9     Lab Results   Component Value Date    AST 16 12/10/2021    AST 19 2017     Lab Results   Component Value Date    ALT 26 12/10/2021    ALT 31 2017     No results found for: BILICONJ   Lab Results   Component Value Date    BILITOTAL 0.5 12/10/2021    BILITOTAL 0.5 2017     Lab Results   Component Value Date    ALBUMIN 4.0 12/10/2021    ALBUMIN 3.6 2017     Lab Results   Component Value Date    PROTTOTAL 7.5 12/10/2021    PROTTOTAL 6.8 2017      Lab Results   Component Value Date    ALKPHOS 106 12/10/2021    ALKPHOS 83 2017     Component      Latest Ref Rng & Units 12/10/2021   Albumin Fraction      3.7 - 5.1 g/dL 4.2   Alpha 1 Fraction      0.2 - 0.4 g/dL 0.3   Alpha 2  Fraction      0.5 - 0.9 g/dL 0.8   Beta Fraction      0.6 - 1.0 g/dL 0.7   Gamma Fraction      0.7 - 1.6 g/dL 0.7   Monoclonal Peak      <=0.0 g/dL 0.0   ELP Interpretation:       Essentially normal electrophoretic pattern. No obvious monoclonal proteins seen. Pathologic significance requires clinical correlation. RICHARD Dasilva M.D., Ph.D., Pathologist ().   [place] Free Light Chains      0.33 - 1.94 mg/dL 1.37   LAMBDA FREE LT CHAINS      0.57 - 2.63 mg/dL 1.23   KAPPA/LAMBDA RATIO      0.26 - 1.65 1.11   Immunofixation ELP       No monoclonal protein seen on immunofixation. Pathological significance requires clinical correlation. RICHARD Dasilva M.D., Ph.D., Pathologist ([phone])   Total Protein Serum for ELP      6.8 - 8.8 g/dL 6.8       IMAGING:  PET 12/20/21:  IMPRESSION: In this patient with spinal degenerative changes and  compression deformity with abnormal bone marrow signal:     1. No suspicious hypermetabolism to the T12, L1, or L2 vertebral  bodies. Please note that this does not fully exclude underlying  malignancy as some malignancies (e.g. multiple myeloma) can be nonavid  and active. Recommend continued following with MRI to monitor the  signal abnormalities described on 12/5/2021.     2. Unchanged severe compression deformity of the T12 vertebral body  with posterior retropulsed fragment resulting in moderate spinal canal  narrowing. Linear hypermetabolism along the compression deformity  defect is most likely posttraumatic, with no nodular hypermetabolism  to indicate underlying lesion.     3. Patchy sclerosis and endplate irregularities in L2 without  suspicious FDG uptake, suggesting combination of  degenerative changes  and Schmorl's node deformities.     4. Focal uptake to the anorectal region, nonspecific, most commonly  secondary to inflammation such as inflamed hemorrhoids, although  degree of uptake is slightly higher than typically seen with  inflammation and this could be  malignant lesion. Consider direct  Visualization.    MRI L-spine 1/19/22:  1. Redemonstrated T12 compression fracture with interval further  height loss, now severe. Retropulsion of the posterior superior  endplate fracture fragment along the ventral aspect of spinal canal  contributes to moderate bordering on moderate to severe spinal canal  stenosis which is similar if not slightly progressed compared to the  prior exam. Continued close interval follow-up is recommended.  2. Unchanged appearance of the L2 compression deformity with mild  height loss.  3. Nonspecific marrow signal changes in the T12 vertebral body, as  before, probably reactive. It is difficult to entirely exclude  underlying marrow replacing lesion, but this is thought to be somewhat  less likely. Continued follow-up is suggested.  4. Patchy nonspecific signal changes in the L2 vertebral body appear  somewhat decreased in degree compared to previous exams, probably  representing decreasing reactive marrow edema in the setting of  subacute fracture. Short interval follow-up MRI exam is suggested. No  other concerning marrow signal abnormalities are identified.  5. Multilevel degenerative changes, as described.    MRI T-spine 1/19/22:  1. Interval further height loss of the T12 compression fracture, now  severe. Redemonstrated retropulsion of the posterior superior endplate  fracture fragment of T12 into the spinal canal resulting in at least  moderate bordering on moderate to severe spinal canal stenosis with  mild mass effect on the distal spinal cord. No spinal cord signal  change identified.  2. Diffuse abnormal marrow signal and enhancement involving the T12  vertebral body is likely reactive in nature. Underlying marrow  replacing lesion is difficult to entirely exclude. Consider follow-up.  3. Multilevel degenerative changes, as described.  4. Please see separate report from lumbar spine MRI of same day for  additional  details.        ASSESSMENT/PLAN:  North Marshall is a 66 year old male with:      1) Abnormal marrow signal: Patient presented to neurosurgery after a fall and T12 compression fracture.  MRI lumbar spine noted abnormal marrow signal, that could be post-traumatic, but could not rule out underlying malignancy, such as myeloma or metastatic disease.  Labs were not consistent with multiple myeloma, including normal kidney function, calcium, hemoglobin, SPEP, serum free light chains.  PET scan showed no suspicious hypermetabolism to the T12, L1 or L2 vertebral bodies.  There is no nodular hypermetabolism to indicate underlying lesion.      There is focal uptake to the anorectal region, nonspecific, most commonly secondary to inflammation, such as inflamed hemorrhoids, although degree of uptake is slightly higher than typically seen with inflammation and could be malignant lesion.  He did get colonoscopy on 1/7/22, which saw internal hemorrhoids.  2 polyps were removed (tubular adenoma).  Next colonoscopy was recommended for 5 years.      There has not been evidence of malignancy found on imaging or labs.   Repeat MRI's on 1/19/22 showed the non-specific marrow signal changes are probably reactive.      -no routine follow-up in oncology scheduled; can follow-up as needed.    2) Compression fracture:  -he is following with neurosurgery, Dr. Antunez    3) Smoking:  -strongly advised smoking cessation      Blanca Funk MD  Hematology/Oncology  [facility]    Total time spent on day of visit, including review of tests, obtaining/reviewing separately obtained history, ordering medications/tests/procedures, communicating with PCP/consultants, and documenting in electronic medical record: 20 minutes      Oncology Rooming Note    January 28, 2022 1:12 PM   North Marshall is a 66 year old male who presents for:    Chief Complaint   Patient presents with     Oncology Clinic Visit     Epidural  "hematoma (H)     Initial Vitals: /77 (BP Location: Right arm, Patient Position: Sitting, Cuff Size: Adult Large)   Pulse 64   Temp 97  F (36.1  C) (Oral)   Resp 18   Wt 98 kg (216 lb)   SpO2 99%   BMI 30.99 kg/m   Estimated body mass index is 30.99 kg/m  as calculated from the following:    Height as of 1/21/22: 1.778 m (5' 10\").    Weight as of this encounter: 98 kg (216 lb). Body surface area is 2.2 meters squared.  Moderate Pain (4) Comment: Data Unavailable   No LMP for male patient.  Allergies reviewed: Yes  Medications reviewed: Yes    Medications: Medication refills not needed today.  Pharmacy name entered into gauzz: [id number] [address]    Clinical concerns: no     Rachel Ch, [facility]                  Again, thank you for allowing me to participate in the care of your patient.        Sincerely,        Blanca Funk MD    "

## 2022-02-03 ENCOUNTER — THERAPY VISIT (OUTPATIENT)
Dept: PHYSICAL THERAPY | Facility: CLINIC | Age: 67
End: 2022-02-03
Payer: COMMERCIAL

## 2022-02-03 DIAGNOSIS — S22.081D CLOSED STABLE BURST FRACTURE OF TWELFTH THORACIC VERTEBRA WITH ROUTINE HEALING, SUBSEQUENT ENCOUNTER: Primary | ICD-10-CM

## 2022-02-03 PROCEDURE — 97110 THERAPEUTIC EXERCISES: CPT | Mod: GP | Performed by: PHYSICAL THERAPIST

## 2022-02-10 ENCOUNTER — THERAPY VISIT (OUTPATIENT)
Dept: PHYSICAL THERAPY | Facility: CLINIC | Age: 67
End: 2022-02-10
Payer: COMMERCIAL

## 2022-02-10 DIAGNOSIS — S22.081D CLOSED STABLE BURST FRACTURE OF TWELFTH THORACIC VERTEBRA WITH ROUTINE HEALING, SUBSEQUENT ENCOUNTER: Primary | ICD-10-CM

## 2022-02-10 PROCEDURE — 97140 MANUAL THERAPY 1/> REGIONS: CPT | Mod: GP | Performed by: PHYSICAL THERAPIST

## 2022-02-10 PROCEDURE — 97110 THERAPEUTIC EXERCISES: CPT | Mod: GP | Performed by: PHYSICAL THERAPIST

## 2022-03-03 ENCOUNTER — THERAPY VISIT (OUTPATIENT)
Dept: PHYSICAL THERAPY | Facility: CLINIC | Age: 67
End: 2022-03-03
Payer: COMMERCIAL

## 2022-03-03 DIAGNOSIS — S22.081D CLOSED STABLE BURST FRACTURE OF TWELFTH THORACIC VERTEBRA WITH ROUTINE HEALING, SUBSEQUENT ENCOUNTER: Primary | ICD-10-CM

## 2022-03-03 PROCEDURE — 97140 MANUAL THERAPY 1/> REGIONS: CPT | Mod: GP | Performed by: PHYSICAL THERAPIST

## 2022-04-27 NOTE — PROGRESS NOTES
Discharge Note    Progress reporting period is from last progress note on   to Mar 3, 2022.    North failed to follow up and current status is unknown.  Please see information below for last relevant information on current status.  Patient seen for 4 visits.    SUBJECTIVE  Subjective changes noted by patient:  Patient was doing pretty well until last week when he did a dead hang on the doorway where he got increased sharp pains. He has been doing pretty good with HEP, feeling tightness today, didn't do any HEP.  .  Current pain level is 2/10.     Previous pain level was  3/10.   Changes in function:  Yes (See Goal flowsheet attached for changes in current functional level)  Adverse reaction to treatment or activity: None    OBJECTIVE  Changes noted in objective findings:       ASSESSMENT/PLAN  Diagnosis: Thoracic pain   Updated problem list and treatment plan:   Manage pain with HEP  STG/LTGs have been met or progress has been made towards goals:  Yes, please see goal flowsheet for most current information  Assessment of Progress: current status is unknown.    Last current status: Pt is progressing as expected, Pt has experienced exacerbation of symptoms, Pt is becoming more independent in their HEP   Self Management Plans:  HEP  I have re-evaluated this patient and find that the nature, scope, duration and intensity of the therapy is appropriate for the medical condition of the patient.  North continues to require the following intervention to meet STG and LTG's:  HEP.    Recommendations:  Discharge with current home program.  Patient to follow up with MD as needed. Patient will need new PT order for continuing treatment.    Please refer to the daily flowsheet for treatment today, total treatment time and time spent performing 1:1 timed codes.    Teresita Hamilton, PT

## 2022-10-16 ENCOUNTER — HEALTH MAINTENANCE LETTER (OUTPATIENT)
Age: 67
End: 2022-10-16

## 2023-07-28 ENCOUNTER — HOSPITAL ENCOUNTER (EMERGENCY)
Facility: CLINIC | Age: 68
Discharge: HOME | End: 2023-07-28
Attending: EMERGENCY MEDICINE | Admitting: EMERGENCY MEDICINE
Payer: COMMERCIAL

## 2023-07-28 ENCOUNTER — APPOINTMENT (OUTPATIENT)
Dept: GENERAL RADIOLOGY | Facility: CLINIC | Age: 68
End: 2023-07-28
Attending: EMERGENCY MEDICINE
Payer: COMMERCIAL

## 2023-07-28 VITALS
DIASTOLIC BLOOD PRESSURE: 63 MMHG | TEMPERATURE: 97.7 F | HEART RATE: 66 BPM | OXYGEN SATURATION: 96 % | BODY MASS INDEX: 30.06 KG/M2 | WEIGHT: 210 LBS | HEIGHT: 70 IN | RESPIRATION RATE: 17 BRPM | SYSTOLIC BLOOD PRESSURE: 128 MMHG

## 2023-07-28 DIAGNOSIS — R05.1 ACUTE COUGH: ICD-10-CM

## 2023-07-28 DIAGNOSIS — E87.1 HYPONATREMIA: ICD-10-CM

## 2023-07-28 LAB
ANION GAP SERPL CALCULATED.3IONS-SCNC: 11 MMOL/L (ref 7–15)
BASOPHILS # BLD AUTO: 0 10E3/UL (ref 0–0.2)
BASOPHILS NFR BLD AUTO: 1 %
BUN SERPL-MCNC: 12.5 MG/DL (ref 8–23)
CALCIUM SERPL-MCNC: 9.5 MG/DL (ref 8.8–10.2)
CHLORIDE SERPL-SCNC: 92 MMOL/L (ref 98–107)
CREAT SERPL-MCNC: 0.78 MG/DL (ref 0.67–1.17)
D DIMER PPP FEU-MCNC: 0.32 UG/ML FEU (ref 0–0.5)
DEPRECATED HCO3 PLAS-SCNC: 24 MMOL/L (ref 22–29)
EOSINOPHIL # BLD AUTO: 0.1 10E3/UL (ref 0–0.7)
EOSINOPHIL NFR BLD AUTO: 1 %
ERYTHROCYTE [DISTWIDTH] IN BLOOD BY AUTOMATED COUNT: 12.1 % (ref 10–15)
GFR SERPL CREATININE-BSD FRML MDRD: >90 ML/MIN/1.73M2
GLUCOSE SERPL-MCNC: 140 MG/DL (ref 70–99)
HCT VFR BLD AUTO: 41.2 % (ref 40–53)
HGB BLD-MCNC: 14.9 G/DL (ref 13.3–17.7)
HOLD SPECIMEN: 0
HOLD SPECIMEN: NORMAL
IMM GRANULOCYTES # BLD: 0 10E3/UL
IMM GRANULOCYTES NFR BLD: 1 %
LYMPHOCYTES # BLD AUTO: 1.3 10E3/UL (ref 0.8–5.3)
LYMPHOCYTES NFR BLD AUTO: 21 %
MCH RBC QN AUTO: 33 PG (ref 26.5–33)
MCHC RBC AUTO-ENTMCNC: 36.2 G/DL (ref 31.5–36.5)
MCV RBC AUTO: 91 FL (ref 78–100)
MONOCYTES # BLD AUTO: 0.5 10E3/UL (ref 0–1.3)
MONOCYTES NFR BLD AUTO: 7 %
NEUTROPHILS # BLD AUTO: 4.4 10E3/UL (ref 1.6–8.3)
NEUTROPHILS NFR BLD AUTO: 69 %
NRBC # BLD AUTO: 0 10E3/UL
NRBC BLD AUTO-RTO: 0 /100
PLATELET # BLD AUTO: 193 10E3/UL (ref 150–450)
POTASSIUM SERPL-SCNC: 3.8 MMOL/L (ref 3.4–5.3)
RBC # BLD AUTO: 4.52 10E6/UL (ref 4.4–5.9)
SARS-COV-2 RNA RESP QL NAA+PROBE: NEGATIVE
SODIUM SERPL-SCNC: 127 MMOL/L (ref 136–145)
TROPONIN T SERPL HS-MCNC: 11 NG/L
WBC # BLD AUTO: 6.3 10E3/UL (ref 4–11)

## 2023-07-28 PROCEDURE — 80048 BASIC METABOLIC PNL TOTAL CA: CPT | Performed by: EMERGENCY MEDICINE

## 2023-07-28 PROCEDURE — 99284 EMERGENCY DEPT VISIT MOD MDM: CPT | Mod: 25

## 2023-07-28 PROCEDURE — 96360 HYDRATION IV INFUSION INIT: CPT

## 2023-07-28 PROCEDURE — 250N000009 HC RX 250: Performed by: EMERGENCY MEDICINE

## 2023-07-28 PROCEDURE — 85025 COMPLETE CBC W/AUTO DIFF WBC: CPT | Performed by: EMERGENCY MEDICINE

## 2023-07-28 PROCEDURE — 36415 COLL VENOUS BLD VENIPUNCTURE: CPT | Performed by: EMERGENCY MEDICINE

## 2023-07-28 PROCEDURE — 71046 X-RAY EXAM CHEST 2 VIEWS: CPT

## 2023-07-28 PROCEDURE — 94640 AIRWAY INHALATION TREATMENT: CPT

## 2023-07-28 PROCEDURE — 85379 FIBRIN DEGRADATION QUANT: CPT | Performed by: EMERGENCY MEDICINE

## 2023-07-28 PROCEDURE — 84484 ASSAY OF TROPONIN QUANT: CPT | Performed by: EMERGENCY MEDICINE

## 2023-07-28 PROCEDURE — 87635 SARS-COV-2 COVID-19 AMP PRB: CPT | Performed by: EMERGENCY MEDICINE

## 2023-07-28 PROCEDURE — 258N000003 HC RX IP 258 OP 636: Performed by: EMERGENCY MEDICINE

## 2023-07-28 RX ORDER — IPRATROPIUM BROMIDE AND ALBUTEROL SULFATE 2.5; .5 MG/3ML; MG/3ML
3 SOLUTION RESPIRATORY (INHALATION) ONCE
Status: COMPLETED | OUTPATIENT
Start: 2023-07-28 | End: 2023-07-28

## 2023-07-28 RX ADMIN — SODIUM CHLORIDE 1000 ML: 9 INJECTION, SOLUTION INTRAVENOUS at 13:56

## 2023-07-28 RX ADMIN — IPRATROPIUM BROMIDE AND ALBUTEROL SULFATE 3 ML: .5; 3 SOLUTION RESPIRATORY (INHALATION) at 13:54

## 2023-07-28 ASSESSMENT — ACTIVITIES OF DAILY LIVING (ADL)
ADLS_ACUITY_SCORE: 35
ADLS_ACUITY_SCORE: 35

## 2023-07-28 NOTE — ED PROVIDER NOTES
"  History     Chief Complaint:  Cough       HPI   North Marshall is a 68 year old male presents with cough and back pain.  Patient notes that he was remodeling his bathroom yesterday and using a  when the vacuum hose became detached filling the room with fine particulates.  He notes afterwards he developed a cough as well as gradual onset pain across his upper back.  Pain waxes and wanes and has been treating with Tylenol.  He notes he has been having some posttussive emesis with episode of emesis this morning.  Had few episodes of watery stool yesterday.  Pain is described as gnawing.  He smokes 1.5 packs of cigarettes per day and has history of peripheral vascular disease.  Denies chest pain, shortness of breath, fever, or abdominal pain.      Independent Historian:   None - Patient Only    Review of External Notes:   Reviewed the patient's office visit from 3/30/2023    Medications:    acetaminophen (TYLENOL) 500 MG tablet  acetaminophen-codeine (TYLENOL W/CODEINE #3) 300-30 MG per tablet  albuterol (PROAIR HFA/PROVENTIL HFA/VENTOLIN HFA) 108 (90 Base) MCG/ACT inhaler  aspirin (ECOTRIN LOW STRENGTH) 81 MG EC tablet  diazepam (VALIUM) 5 MG tablet  magnesium oxide (MAG-OX) 400 MG tablet  naproxen sodium (ANAPROX) 220 MG tablet  rosuvastatin (CRESTOR) 40 MG tablet  senna-docusate (SENOKOT-S/PERICOLACE) 8.6-50 MG tablet  valsartan-hydrochlorothiazide (DIOVAN HCT) 160-12.5 MG tablet  Vitamin D, Cholecalciferol, 25 MCG (1000 UT) TABS        Past Medical History:    Epidural hematoma  Hypertension  Hyperlipidemia  Compression fracture of T12 vertebra  Peripheral vascular disease  Tobacco use disorder    Past Surgical History:    No past surgical history on file.     Physical Exam     Patient Vitals for the past 24 hrs:   BP Temp Temp src Pulse Resp SpO2 Height Weight   07/28/23 1300 128/63 -- -- 66 17 96 % -- --   07/28/23 1208 104/54 97.7  F (36.5  C) Temporal 98 20 97 % 1.778 m (5' 10\") 95.3 kg (210 " lb)        Physical Exam  General: Alert and cooperative with exam. Patient in mild distress. Normal mentation.  Overweight.  Head:  Scalp is NC/AT  Eyes:  No scleral icterus, PERRL  ENT:  The external nose and ears are normal. The oropharynx is normal and without erythema; mucus membranes are moist. Uvula midline, no evidence of deep space infection.  Neck:  Normal range of motion without rigidity.  CV:  Regular rate and rhythm  Resp:  Breath sounds are clear bilaterally.  Mild to moderate cough on exam    Non-labored, no retractions or accessory muscle use  GI:  Abdomen is soft, no distension, no tenderness. No peritoneal signs  MS:  No lower extremity edema   Skin:  Warm and dry, No rash or lesions noted.  Neuro: Oriented x 3. No gross motor deficits.    Emergency Department Course   [id number] 12:12  Ventricular rate 74, WY interval 170, QRS duration 86, QT/QTc 372/412  Sinus rhythm with premature atrial complexes with nonspecific T wave abnormality.  EKG not significantly changed from 2017  Interpretation Boy Weeks,      Imaging:  Chest XR,  PA & LAT   Final Result   IMPRESSION: No acute cardiopulmonary disease.      MIGDALIA HARTMAN MD            SYSTEM ID:  [id number]         Report per radiology    Laboratory:  Labs Ordered and Resulted from Time of ED Arrival to Time of ED Departure   BASIC METABOLIC PANEL - Abnormal       Result Value    Sodium 127 (*)     Potassium 3.8      Chloride 92 (*)     Carbon Dioxide (CO2) 24      Anion Gap 11      Urea Nitrogen 12.5      Creatinine 0.78      Calcium 9.5      Glucose 140 (*)     GFR Estimate >90     TROPONIN T, HIGH SENSITIVITY - Normal    Troponin T, High Sensitivity 11     COVID-19 VIRUS (CORONAVIRUS) BY PCR - Normal    SARS CoV2 PCR Negative     D DIMER QUANTITATIVE - Normal    D-Dimer Quantitative 0.32     CBC WITH PLATELETS AND DIFFERENTIAL    WBC Count 6.3      RBC Count 4.52      Hemoglobin 14.9      Hematocrit 41.2      MCV 91      MCH  33.0      MCHC 36.2      RDW 12.1      Platelet Count 193      % Neutrophils 69      % Lymphocytes 21      % Monocytes 7      % Eosinophils 1      % Basophils 1      % Immature Granulocytes 1      NRBCs per 100 WBC 0      Absolute Neutrophils 4.4      Absolute Lymphocytes 1.3      Absolute Monocytes 0.5      Absolute Eosinophils 0.1      Absolute Basophils 0.0      Absolute Immature Granulocytes 0.0      Absolute NRBCs 0.0            Emergency Department Course & Assessments:      Interventions:  Medications   0.9% sodium chloride BOLUS (0 mLs Intravenous Stopped 7/28/23 9717)   ipratropium - albuterol 0.5 mg/2.5 mg/3 mL (DUONEB) neb solution 3 mL (3 mLs Nebulization $Given 7/28/23 3368)        Independent Interpretation (X-rays, CTs, rhythm strip):  I reviewed the patient's chest x-ray, no evidence of acute infiltrate, effusion, or pneumothorax    Consultations/Discussion of Management or Tests:  None        Social Determinants of Health affecting care:   None    Disposition:  The patient was discharged to home.     Impression & Plan      Medical Decision Making:  Patient is a 68-year-old male presents with 1 day history of cough/posttussive emesis and discomfort in his upper back which occurred after significant dust exposure while renovating his bathroom.  On evaluation patient is nontoxic in appearance with normal vital signs.  EKG without evidence of acute ischemia, infarction, or significant arrhythmia; unchanged from previous.  Troponin is within normal range and there is low suspicion for ACS.  Chest x-ray without significant findings; lungs are clear to auscultation.  D-dimer is not elevated; low clinical suspicion for PE or aortic dissection.  Patient did receive nebulizer treatment in the ED with improvement in symptoms.  He does have albuterol inhaler at home and has significant previous smoking history.  Potential mild pneumonitis.  Back pain appears to be musculoskeletal.  Recommended continued use of  home albuterol inhaler as needed.  Labs were notable for mild hyponatremia (sodium 127; did receive 1 L IV normal saline in the ED; patient notes moderate alcohol usage which may be contributing factor to low sodium).  Patient to follow-up closely with PCP for follow-up of ED visit and recheck of sodium.  At this time I feel he is safe and appropriate for continued outpatient management.  Return precautions were discussed.      Diagnosis:    ICD-10-CM    1. Acute cough  R05.1       2. Hyponatremia  E87.1                 Boy Weeks,   07/28/23 2254

## 2023-07-28 NOTE — ED NOTES
Pt sawing concrete over the week, severe cough, chest pain radiating to neck, severe pain across shoulders. /54

## 2023-08-26 ENCOUNTER — HEALTH MAINTENANCE LETTER (OUTPATIENT)
Age: 68
End: 2023-08-26

## 2023-11-04 ENCOUNTER — HEALTH MAINTENANCE LETTER (OUTPATIENT)
Age: 68
End: 2023-11-04

## 2023-12-20 NOTE — PHARMACY-ADMISSION MEDICATION HISTORY
Admission medication history interview status for the 7/24/2017  admission is complete. See EPIC admission navigator for prior to admission medications     Medication history source reliability:Good    Actions taken by pharmacist (provider contacted, etc):None     Additional medication history information not noted on PTA med list :    -added simvastatin and prn advil     Medication reconciliation/reorder completed by provider prior to medication history? No    Time spent in this activity: 30 mins     Prior to Admission medications    Medication Sig Last Dose Taking? Auth Provider   SIMVASTATIN PO Take 40 mg by mouth At Bedtime 7/23/2017 at Unknown time Yes Unknown, Entered By History   Ibuprofen (ADVIL PO) Take 200 mg by mouth every 8 hours as needed for moderate pain Past Month at prn Yes Unknown, Entered By History       
4 (moderate pain)

## 2024-12-22 ENCOUNTER — HEALTH MAINTENANCE LETTER (OUTPATIENT)
Age: 69
End: 2024-12-22

## (undated) RX ORDER — REGADENOSON 0.08 MG/ML
INJECTION, SOLUTION INTRAVENOUS
Status: DISPENSED
Start: 2017-07-25